# Patient Record
Sex: FEMALE | Race: WHITE | NOT HISPANIC OR LATINO | Employment: OTHER | URBAN - METROPOLITAN AREA
[De-identification: names, ages, dates, MRNs, and addresses within clinical notes are randomized per-mention and may not be internally consistent; named-entity substitution may affect disease eponyms.]

---

## 2017-01-21 DIAGNOSIS — E11.9 TYPE 2 DIABETES MELLITUS WITHOUT COMPLICATION: ICD-10-CM

## 2017-01-21 DIAGNOSIS — E78.5 HYPERLIPIDEMIA: ICD-10-CM

## 2017-01-21 DIAGNOSIS — I73.9 PERIPHERAL ARTERY DISEASE: ICD-10-CM

## 2017-01-21 DIAGNOSIS — F32.A DEPRESSION: ICD-10-CM

## 2017-01-21 DIAGNOSIS — I10 BENIGN ESSENTIAL HTN: ICD-10-CM

## 2017-01-23 DIAGNOSIS — I10 BENIGN ESSENTIAL HTN: ICD-10-CM

## 2017-01-23 RX ORDER — ATENOLOL 100 MG/1
TABLET ORAL
Qty: 90 TABLET | Refills: 1 | Status: SHIPPED | OUTPATIENT
Start: 2017-01-23 | End: 2017-02-15 | Stop reason: SDUPTHER

## 2017-01-23 RX ORDER — CILOSTAZOL 100 MG/1
TABLET ORAL
Qty: 180 TABLET | Refills: 1 | Status: SHIPPED | OUTPATIENT
Start: 2017-01-23 | End: 2017-02-15 | Stop reason: SDUPTHER

## 2017-01-23 RX ORDER — LOVASTATIN 20 MG/1
TABLET ORAL
Qty: 90 TABLET | Refills: 1 | Status: SHIPPED | OUTPATIENT
Start: 2017-01-23 | End: 2017-02-15 | Stop reason: SDUPTHER

## 2017-01-23 RX ORDER — ESCITALOPRAM OXALATE 10 MG/1
TABLET ORAL
Qty: 90 TABLET | Refills: 1 | Status: SHIPPED | OUTPATIENT
Start: 2017-01-23 | End: 2017-02-15 | Stop reason: SDUPTHER

## 2017-01-23 RX ORDER — METFORMIN HYDROCHLORIDE 500 MG/1
TABLET, EXTENDED RELEASE ORAL
Qty: 90 TABLET | Refills: 1 | Status: SHIPPED | OUTPATIENT
Start: 2017-01-23 | End: 2017-02-15 | Stop reason: SDUPTHER

## 2017-01-24 RX ORDER — AMLODIPINE AND OLMESARTAN MEDOXOMIL 5; 40 MG/1; MG/1
TABLET ORAL
Qty: 90 TABLET | Refills: 1 | Status: SHIPPED | OUTPATIENT
Start: 2017-01-24 | End: 2017-02-15 | Stop reason: SDUPTHER

## 2017-01-31 RX ORDER — NYSTATIN 100000 U/G
CREAM TOPICAL 2 TIMES DAILY
Qty: 30 G | Refills: 4 | Status: SHIPPED | OUTPATIENT
Start: 2017-01-31 | End: 2017-02-02 | Stop reason: SDUPTHER

## 2017-02-02 RX ORDER — NYSTATIN 100000 U/G
CREAM TOPICAL
Qty: 15 G | Refills: 3 | Status: SHIPPED | OUTPATIENT
Start: 2017-02-02 | End: 2017-02-15 | Stop reason: SDUPTHER

## 2017-02-15 ENCOUNTER — OFFICE VISIT (OUTPATIENT)
Dept: INTERNAL MEDICINE | Facility: CLINIC | Age: 82
End: 2017-02-15
Payer: MEDICARE

## 2017-02-15 VITALS
TEMPERATURE: 98 F | OXYGEN SATURATION: 96 % | SYSTOLIC BLOOD PRESSURE: 110 MMHG | HEIGHT: 63 IN | WEIGHT: 138.44 LBS | BODY MASS INDEX: 24.53 KG/M2 | HEART RATE: 82 BPM | RESPIRATION RATE: 16 BRPM | DIASTOLIC BLOOD PRESSURE: 60 MMHG

## 2017-02-15 DIAGNOSIS — I10 BENIGN ESSENTIAL HTN: ICD-10-CM

## 2017-02-15 DIAGNOSIS — I73.9 PERIPHERAL ARTERY DISEASE: ICD-10-CM

## 2017-02-15 DIAGNOSIS — Z78.0 ASYMPTOMATIC MENOPAUSAL STATE: ICD-10-CM

## 2017-02-15 DIAGNOSIS — Z13.820 OSTEOPOROSIS SCREENING: Primary | ICD-10-CM

## 2017-02-15 DIAGNOSIS — G47.00 INSOMNIA, UNSPECIFIED TYPE: ICD-10-CM

## 2017-02-15 DIAGNOSIS — E11.9 TYPE 2 DIABETES MELLITUS WITHOUT COMPLICATION, WITHOUT LONG-TERM CURRENT USE OF INSULIN: ICD-10-CM

## 2017-02-15 DIAGNOSIS — F32.A DEPRESSION, UNSPECIFIED DEPRESSION TYPE: ICD-10-CM

## 2017-02-15 DIAGNOSIS — E78.5 HYPERLIPIDEMIA, UNSPECIFIED HYPERLIPIDEMIA TYPE: ICD-10-CM

## 2017-02-15 PROCEDURE — 1159F MED LIST DOCD IN RCRD: CPT | Mod: S$GLB,,, | Performed by: INTERNAL MEDICINE

## 2017-02-15 PROCEDURE — 1160F RVW MEDS BY RX/DR IN RCRD: CPT | Mod: S$GLB,,, | Performed by: INTERNAL MEDICINE

## 2017-02-15 PROCEDURE — 1126F AMNT PAIN NOTED NONE PRSNT: CPT | Mod: S$GLB,,, | Performed by: INTERNAL MEDICINE

## 2017-02-15 PROCEDURE — 99499 UNLISTED E&M SERVICE: CPT | Mod: S$GLB,,, | Performed by: INTERNAL MEDICINE

## 2017-02-15 PROCEDURE — 1157F ADVNC CARE PLAN IN RCRD: CPT | Mod: S$GLB,,, | Performed by: INTERNAL MEDICINE

## 2017-02-15 PROCEDURE — 99214 OFFICE O/P EST MOD 30 MIN: CPT | Mod: S$GLB,,, | Performed by: INTERNAL MEDICINE

## 2017-02-15 RX ORDER — AMLODIPINE AND OLMESARTAN MEDOXOMIL 5; 40 MG/1; MG/1
1 TABLET ORAL DAILY
COMMUNITY
Start: 2017-01-24 | End: 2017-02-15 | Stop reason: SDUPTHER

## 2017-02-15 RX ORDER — CILOSTAZOL 100 MG/1
100 TABLET ORAL 2 TIMES DAILY
Qty: 180 TABLET | Refills: 1 | Status: SHIPPED | OUTPATIENT
Start: 2017-02-15 | End: 2017-10-24 | Stop reason: SDUPTHER

## 2017-02-15 RX ORDER — LOVASTATIN 20 MG/1
20 TABLET ORAL NIGHTLY
Qty: 90 TABLET | Refills: 1 | Status: SHIPPED | OUTPATIENT
Start: 2017-02-15 | End: 2017-08-23 | Stop reason: SDUPTHER

## 2017-02-15 RX ORDER — TEMAZEPAM 15 MG/1
15 CAPSULE ORAL NIGHTLY PRN
Qty: 30 CAPSULE | Refills: 3 | Status: SHIPPED | OUTPATIENT
Start: 2017-02-15 | End: 2017-05-17 | Stop reason: SDUPTHER

## 2017-02-15 RX ORDER — NYSTATIN 100000 U/G
CREAM TOPICAL
Qty: 15 G | Refills: 3 | Status: SHIPPED | OUTPATIENT
Start: 2017-02-15 | End: 2018-12-11 | Stop reason: SDUPTHER

## 2017-02-15 RX ORDER — ESCITALOPRAM OXALATE 10 MG/1
TABLET ORAL
Qty: 90 TABLET | Refills: 1 | Status: SHIPPED | OUTPATIENT
Start: 2017-02-15 | End: 2017-08-23 | Stop reason: SDUPTHER

## 2017-02-15 RX ORDER — AMLODIPINE AND OLMESARTAN MEDOXOMIL 5; 40 MG/1; MG/1
TABLET ORAL
Qty: 90 TABLET | Refills: 1 | Status: SHIPPED | OUTPATIENT
Start: 2017-02-15 | End: 2017-08-23 | Stop reason: SDUPTHER

## 2017-02-15 RX ORDER — ATENOLOL 100 MG/1
TABLET ORAL
Qty: 90 TABLET | Refills: 1 | Status: SHIPPED | OUTPATIENT
Start: 2017-02-15 | End: 2017-07-05 | Stop reason: SDUPTHER

## 2017-02-15 RX ORDER — METFORMIN HYDROCHLORIDE 500 MG/1
TABLET, EXTENDED RELEASE ORAL
Qty: 90 TABLET | Refills: 1 | Status: SHIPPED | OUTPATIENT
Start: 2017-02-15 | End: 2017-08-23 | Stop reason: SDUPTHER

## 2017-02-15 NOTE — MR AVS SNAPSHOT
[facility]  [address] ,  [address]  [place] [address]  Phone: [phone]  Fax: [phone]                  Viri Carpio   2/15/2017 9:20 AM   Office Visit    Description:  Female : 1927   Provider:  Alesha Casiano MD   Department:  [facility]           Reason for Visit     Follow-up     Medication Refill           Diagnoses this Visit        Comments    Osteoporosis screening    -  Primary     Insomnia, unspecified type         Benign essential HTN         Peripheral artery disease         Depression, unspecified depression type         Hyperlipidemia, unspecified hyperlipidemia type         Type 2 diabetes mellitus without complication, without long-term current use of insulin         Asymptomatic menopausal state                To Do List           Future Appointments        Provider Department Dept Phone    2017 9:20 AM Alesha Casiano MD [facility] [phone]      Goals (5 Years of Data)     None      Follow-Up and Disposition     Return in about 3 months (around 5/15/2017).       These Medications        Disp Refills Start End    temazepam (RESTORIL) 15 mg Cap 30 capsule 3 2/15/2017     Take 1 capsule (15 mg total) by mouth nightly as needed. - Oral    Ph[facility]: [id number] - [place] - [address] [facility] Ph #: [phone]       amlodipine-olmesartan (JIGNA) 5-40 mg per tablet 90 tablet 1 2/15/2017     TAKE 1 TABLET ONE TIME DAILY    Ph[facility]: [id number] - [place] - [address] [facility] Ph #: [phone]       atenolol (TENORMIN) 100 MG tablet 90 tablet 1 2/15/2017     TAKE 1 TABLET ONE TIME DAILY    Ph[facility]acy: [id number] - [place] - [address] [facility] Ph #: [phone]       cilostazol (PLETAL) 100 MG Tab 180 tablet 1 2/15/2017     Take 1 tablet (100 mg total) by mouth 2 (two) times daily. - Oral    Pharmacy: [id number] -  [place], [facility] Ph #: [phone]       escitalopram oxalate (LEXAPRO) 10 MG tablet 90 tablet 1 2/15/2017     TAKE 1 TABLET ONE TIME DAILY    Pharmacy: [id number]  [place], [address] Ph #: [phone]       lovastatin (MEVACOR) 20 MG tablet 90 tablet 1 2/15/2017     Take 1 tablet (20 mg total) by mouth every evening. - Oral    Pharmacy: [id number]  [place], [address] Ph #: [phone]       metformin (GLUCOPHAGE-XR) 500 MG 24 hr tablet 90 tablet 1 2/15/2017     TAKE 1 TABLET ONE TIME DAILY    [id number] [address] Ph #: [phone]       nystatin (MYCOSTATIN) cream 15 g 3 2/15/2017     APPLY TO AFFECTED AREAS THREE TIMES A DAY WITH OTHER CREAM    Pharmacy: [id number] - [place], [address] Ph #: [phone]         [facility]     [facility] Nurse Care Line - 24/7 Assistance  Registered nurses in the [facility] provide clinical advisement, health education, appointment booking, and other advisory services.  Call for this free service at [phone].             Medications           Message regarding Medications     Verify the changes and/or additions to your medication regime listed below are the same as discussed with your clinician today.  If any of these changes or additions are incorrect, please notify your healthcare provider.        CHANGE how you are taking these medications     Start Taking Instead of    cilostazol (PLETAL) 100 MG Tab cilostazol (PLETAL) 100 MG Tab    Dosage:  Take 1 tablet (100 mg total) by mouth 2 (two) times daily. Dosage:  TAKE 1 TABLET TWICE DAILY    Reason for Change:  Reorder     lovastatin (MEVACOR) 20 MG tablet lovastatin (MEVACOR) 20 MG tablet    Dosage:  Take 1 tablet (20 mg total) by mouth every evening. Dosage:  TAKE 1 TABLET EVERY EVENING    Reason for Change:  Reorder           "  Verify that the below list of medications is an accurate representation of the medications you are currently taking.  If none reported, the list may be blank. If incorrect, please contact your healthcare provider. Carry this list with you in case of emergency.           Current Medications     amlodipine-olmesartan (JIGNA) 5-40 mg per tablet TAKE 1 TABLET ONE TIME DAILY    aspirin (ECOTRIN) 81 MG EC tablet Take 81 mg by mouth once daily.    atenolol (TENORMIN) 100 MG tablet TAKE 1 TABLET ONE TIME DAILY    cilostazol (PLETAL) 100 MG Tab Take 1 tablet (100 mg total) by mouth 2 (two) times daily.    escitalopram oxalate (LEXAPRO) 10 MG tablet TAKE 1 TABLET ONE TIME DAILY    lovastatin (MEVACOR) 20 MG tablet Take 1 tablet (20 mg total) by mouth every evening.    metformin (GLUCOPHAGE-XR) 500 MG 24 hr tablet TAKE 1 TABLET ONE TIME DAILY    nystatin (MYCOSTATIN) cream APPLY TO AFFECTED AREAS THREE TIMES A DAY WITH OTHER CREAM    temazepam (RESTORIL) 15 mg Cap Take 1 capsule (15 mg total) by mouth nightly as needed.    triamcinolone acetonide 0.1% (KENALOG) 0.1 % cream            Clinical Reference Information           Your Vitals Were     BP Pulse Temp Resp Height Weight    110/60 (BP Location: Right arm, Patient Position: Sitting, BP Method: Manual) 82 98.1 °F (36.7 °C) (Oral) 16 5' 3" (1.6 m) 62.8 kg (138 lb 7.2 oz)    SpO2 BMI             96% 24.53 kg/m2         Blood Pressure          Most Recent Value    BP  110/60      Allergies as of 2/15/2017     No Known Allergies      Immunizations Administered on Date of Encounter - 2/15/2017     Name Date Dose VIS Date Route    Pneumococcal Polysaccharide - 23 Valent  Incomplete 0.5 mL 4/24/2015 Intramuscular      Orders Placed During Today's Visit      Normal Orders This Visit    Pneumococcal Polysaccharide Vaccine (23 Valent) (SQ/IM)     Future Labs/Procedures Expected by Expires    CBC auto differential  2/15/2017 2/15/2018    Comprehensive metabolic panel  2/15/2017 " 2/15/2018    DXA Bone Density Spine And Hip  2/15/2017 2/15/2018    Hemoglobin A1c  2/15/2017 2/15/2018    Lipid panel  2/15/2017 2/15/2018    Microalbumin/creatinine urine ratio  2/15/2017 2/15/2018      [facility] Sign-Up     Activating your [facility] account is as easy as 1-2-3!     1) Visit [email or web address], select Sign Up Now, enter this activation code and your date of birth, then select Next.  [email or web address]  Expires: 4/1/2017 10:17 AM      2) Create a username and password to use when you visit [facility] in the future and select a security question in case you lose your password and select Next.    3) Enter your e-mail address and click Sign Up!    Additional Information  If you have questions, please e-mail [email or web address] or call [phone] to talk to our [facility] staff. Remember, [facility] is NOT to be used for urgent needs. For medical emergencies, dial 911.         Language Assistance Services     ATTENTION: Language assistance services are available, free of charge. Please call [phone].      ATENCIÓN: Si habla español, tiene a lakhani disposición servicios gratuitos de asistencia lingüística. Llame al [phone].     CHÚ Ý: N?u b?n nói Ti?ng Vi?t, có các d?ch v? h? tr? ngôn ng? mi?n phí dành cho b?n. G?i s? [phone].         [facility] complies with applicable Federal civil rights laws and does not discriminate on the basis of race, color, national origin, age, disability, or sex.

## 2017-02-15 NOTE — PROGRESS NOTES
"Subjective:      Patient ID: Viri Carpio is a 89 y.o. female.    Chief Complaint: Medication Refill; Diabetes; and Hypertension    HPI: 89y/oWF, has done very well in general.  No recent labs.  However, she has been compliant with her medications.    Review of Systems   Constitutional: Negative.    HENT: Negative.    Eyes: Negative.    Respiratory: Negative.    Cardiovascular: Negative.    Gastrointestinal: Negative.    Endocrine: Negative.    Musculoskeletal: Negative.    Allergic/Immunologic: Negative.    Neurological: Negative.    Psychiatric/Behavioral: Negative.        Objective:     Visit Vitals    /60 (BP Location: Right arm, Patient Position: Sitting, BP Method: Manual)    Pulse 82    Temp 98.1 °F (36.7 °C) (Oral)    Resp 16    Ht 5' 3" (1.6 m)    Wt 62.8 kg (138 lb 7.2 oz)    SpO2 96%    BMI 24.53 kg/m2       Physical Exam   Constitutional: She is oriented to person, place, and time. She appears well-developed and well-nourished. No distress.   HENT:   Head: Normocephalic and atraumatic.   Right Ear: External ear normal.   Left Ear: External ear normal.   Nose: Nose normal.   Mouth/Throat: Oropharynx is clear and moist.   Bilat hearing aides  dentures   Eyes: Conjunctivae and EOM are normal. Pupils are equal, round, and reactive to light.   Neck: Normal range of motion. Neck supple.   Cardiovascular: Normal rate, regular rhythm and normal heart sounds.    Pulmonary/Chest: Effort normal and breath sounds normal.   Abdominal: Soft. Bowel sounds are normal. There is no hepatosplenomegaly. There is no tenderness.   Musculoskeletal: Normal range of motion. She exhibits no edema.   Neurological: She is alert and oriented to person, place, and time. She has normal reflexes.   Skin: Skin is warm and dry. She is not diaphoretic.   Psychiatric: She has a normal mood and affect. Her behavior is normal. Judgment and thought content normal.   Nursing note and vitals reviewed.      Assessment:     1. " Osteoporosis screening    2. Insomnia, unspecified type    3. Benign essential HTN    4. Peripheral artery disease    5. Depression, unspecified depression type    6. Hyperlipidemia, unspecified hyperlipidemia type    7. Type 2 diabetes mellitus without complication, without long-term current use of insulin    8. Asymptomatic menopausal state       Plan:     Osteoporosis screening  -     DXA Bone Density Spine And Hip; Future; Expected date: 2/15/17    Insomnia, unspecified type  -     temazepam (RESTORIL) 15 mg Cap; Take 1 capsule (15 mg total) by mouth nightly as needed.  Dispense: 30 capsule; Refill: 3    Benign essential HTN  -     CBC auto differential; Future; Expected date: 2/15/17  -     Comprehensive metabolic panel; Future; Expected date: 2/15/17  -     amlodipine-olmesartan (JIGNA) 5-40 mg per tablet; TAKE 1 TABLET ONE TIME DAILY  Dispense: 90 tablet; Refill: 1  -     atenolol (TENORMIN) 100 MG tablet; TAKE 1 TABLET ONE TIME DAILY  Dispense: 90 tablet; Refill: 1    Peripheral artery disease  -     cilostazol (PLETAL) 100 MG Tab; Take 1 tablet (100 mg total) by mouth 2 (two) times daily.  Dispense: 180 tablet; Refill: 1    Depression, unspecified depression type  -     escitalopram oxalate (LEXAPRO) 10 MG tablet; TAKE 1 TABLET ONE TIME DAILY  Dispense: 90 tablet; Refill: 1    Hyperlipidemia, unspecified hyperlipidemia type  -     Lipid panel; Future; Expected date: 2/15/17  -     lovastatin (MEVACOR) 20 MG tablet; Take 1 tablet (20 mg total) by mouth every evening.  Dispense: 90 tablet; Refill: 1    Type 2 diabetes mellitus without complication, without long-term current use of insulin  -     Hemoglobin A1c; Future; Expected date: 2/15/17  -     Microalbumin/creatinine urine ratio; Future; Expected date: 2/15/17  -     metformin (GLUCOPHAGE-XR) 500 MG 24 hr tablet; TAKE 1 TABLET ONE TIME DAILY  Dispense: 90 tablet; Refill: 1    Asymptomatic menopausal state   -     DXA Bone Density Spine And Hip; Future;  Expected date: 2/15/17    Other orders  -     Cancel: Pneumococcal Polysaccharide Vaccine (23 Valent) (SQ/IM)  -     nystatin (MYCOSTATIN) cream; APPLY TO AFFECTED AREAS THREE TIMES A DAY WITH OTHER CREAM  Dispense: 15 g; Refill: 3

## 2017-05-17 ENCOUNTER — OFFICE VISIT (OUTPATIENT)
Dept: INTERNAL MEDICINE | Facility: CLINIC | Age: 82
End: 2017-05-17
Payer: MEDICARE

## 2017-05-17 VITALS
RESPIRATION RATE: 16 BRPM | SYSTOLIC BLOOD PRESSURE: 132 MMHG | BODY MASS INDEX: 24.1 KG/M2 | HEART RATE: 60 BPM | TEMPERATURE: 98 F | DIASTOLIC BLOOD PRESSURE: 65 MMHG | HEIGHT: 63 IN | WEIGHT: 136 LBS | OXYGEN SATURATION: 96 %

## 2017-05-17 DIAGNOSIS — E78.5 HYPERLIPIDEMIA, UNSPECIFIED HYPERLIPIDEMIA TYPE: ICD-10-CM

## 2017-05-17 DIAGNOSIS — E11.9 TYPE 2 DIABETES MELLITUS WITHOUT COMPLICATION, WITHOUT LONG-TERM CURRENT USE OF INSULIN: Primary | ICD-10-CM

## 2017-05-17 DIAGNOSIS — I10 BENIGN ESSENTIAL HTN: ICD-10-CM

## 2017-05-17 DIAGNOSIS — G47.00 INSOMNIA, UNSPECIFIED TYPE: ICD-10-CM

## 2017-05-17 PROCEDURE — 99499 UNLISTED E&M SERVICE: CPT | Mod: S$GLB,,, | Performed by: INTERNAL MEDICINE

## 2017-05-17 PROCEDURE — 1159F MED LIST DOCD IN RCRD: CPT | Mod: S$GLB,,, | Performed by: INTERNAL MEDICINE

## 2017-05-17 PROCEDURE — 1126F AMNT PAIN NOTED NONE PRSNT: CPT | Mod: S$GLB,,, | Performed by: INTERNAL MEDICINE

## 2017-05-17 PROCEDURE — 1160F RVW MEDS BY RX/DR IN RCRD: CPT | Mod: S$GLB,,, | Performed by: INTERNAL MEDICINE

## 2017-05-17 PROCEDURE — 99214 OFFICE O/P EST MOD 30 MIN: CPT | Mod: S$GLB,,, | Performed by: INTERNAL MEDICINE

## 2017-05-17 RX ORDER — TEMAZEPAM 15 MG/1
15 CAPSULE ORAL NIGHTLY PRN
Qty: 30 CAPSULE | Refills: 3 | Status: SHIPPED | OUTPATIENT
Start: 2017-05-17 | End: 2017-06-19 | Stop reason: SDUPTHER

## 2017-05-17 RX ORDER — NYSTATIN AND TRIAMCINOLONE ACETONIDE 100000; 1 [USP'U]/G; MG/G
CREAM TOPICAL
COMMUNITY
Start: 2017-05-16

## 2017-05-17 NOTE — MR AVS SNAPSHOT
[facility]  [address],  [address]  [place] [address]  Phone: [phone]  Fax: [phone]                  Viri Carpio   2017 9:20 AM   Office Visit    Description:  Female : 1927   Provider:  Alesha Casiano MD   Department:  [facility]           Reason for Visit     Results     Medication Refill           Diagnoses this Visit        Comments    Insomnia, unspecified type                To Do List           Goals (5 Years of Data)     None       These Medications        Disp Refills Start End    temazepam (RESTORIL) 15 mg Cap 30 capsule 3 2017     Take 1 capsule (15 mg total) by mouth nightly as needed. - Oral    Pharmacy: [facility] - [place] [id number] [facility] Ph #: [phone]         [facility] On Call     [facility] On Call Nurse Care Line -  Assistance  Unless otherwise directed by your provider, please contact [facility], our nurse care line that is available for  assistance.     Registered nurses in the [facility] provide: appointment scheduling, clinical advisement, health education, and other advisory services.  Call: [phone] (toll free)               Medications           Message regarding Medications     Verify the changes and/or additions to your medication regime listed below are the same as discussed with your clinician today.  If any of these changes or additions are incorrect, please notify your healthcare provider.             Verify that the below list of medications is an accurate representation of the medications you are currently taking.  If none reported, the list may be blank. If incorrect, please contact your healthcare provider. Carry this list with you in case of emergency.           Current Medications     amlodipine-olmesartan (JIGNA) 5-40 mg per tablet TAKE 1 TABLET ONE TIME DAILY    aspirin (ECOTRIN) 81 MG EC tablet Take 81 mg by  "mouth once daily.    atenolol (TENORMIN) 100 MG tablet TAKE 1 TABLET ONE TIME DAILY    cilostazol (PLETAL) 100 MG Tab Take 1 tablet (100 mg total) by mouth 2 (two) times daily.    escitalopram oxalate (LEXAPRO) 10 MG tablet TAKE 1 TABLET ONE TIME DAILY    lovastatin (MEVACOR) 20 MG tablet Take 1 tablet (20 mg total) by mouth every evening.    metformin (GLUCOPHAGE-XR) 500 MG 24 hr tablet TAKE 1 TABLET ONE TIME DAILY    nystatin (MYCOSTATIN) cream APPLY TO AFFECTED AREAS THREE TIMES A DAY WITH OTHER CREAM    nystatin-triamcinolone (MYCOLOG II) cream     temazepam (RESTORIL) 15 mg Cap Take 1 capsule (15 mg total) by mouth nightly as needed.    triamcinolone acetonide 0.1% (KENALOG) 0.1 % cream            Clinical Reference Information           Your Vitals Were     BP Pulse Temp Resp Height Weight    132/65 (BP Location: Left arm, Patient Position: Sitting, BP Method: Manual) 60 97.8 °F (36.6 °C) (Oral) 16 5' 3" (1.6 m) 61.7 kg (136 lb 0.4 oz)    SpO2 BMI             96% 24.1 kg/m2         Blood Pressure          Most Recent Value    BP  132/65      Allergies as of 5/17/2017     No Known Allergies      Immunizations Administered on Date of Encounter - 5/17/2017     None      [facility] Sign-Up     Activating your [facility] account is as easy as 1-2-3!     1) Visit [email or web address], select Sign Up Now, enter this activation code and your date of birth, then select Next.  [email or web address]  Expires: 7/1/2017 10:30 AM      2) Create a username and password to use when you visit [facility] in the future and select a security question in case you lose your password and select Next.    3) Enter your e-mail address and click Sign Up!    Additional Information  If you have questions, please e-mail [email or web address] or call [phone] to talk to our [facility] staff. Remember, [facility] is NOT to be used for urgent needs. For medical emergencies, dial 911.         Language Assistance Services     ATTENTION: Language " assistance services are available, free of charge. Please call [phone].      ATENCIÓN: Si habla ulises, tiene a lakhani disposición servicios gratuitos de asistencia lingüística. Llame al [phone].     CHÚ Ý: N?u b?n nói Ti?ng Vi?t, có các d?ch v? h? tr? ngôn ng? mi?n phí dành cho b?n. G?i s? [phone].         [facility] complies with applicable Federal civil rights laws and does not discriminate on the basis of race, color, national origin, age, disability, or sex.

## 2017-05-17 NOTE — PROGRESS NOTES
Subjective:      Patient ID: Viri Carpio is a 89 y.o. female.    Chief Complaint: Results and Medication Refill    HPI: 89y/oWF, very active, asymptomatic.  Does everything for herself and her family.  Longevity in her family.  No hypoglycemic symptoms.  No CP,SOB,ATKINSON,PND,orthopnea.  No dysuria,polaquiria,polidipsia.  No leg pain.  No headaches.      Here with daughter. Results:  05/09/17 1023 HDL 55 - Final result   05/09/17 1023 CHOL 177 - Final result   05/09/17 1023 TRIG 141 - Final result   05/09/17 1023 LDLCALC 93.8 - Final result   05/09/17 1023 CHOLHDL 31.1 - Final result   05/09/17 1023 NONHDLCHOL 122 - Final result   05/09/17 1023 TOTALCHOLEST 3.2 - Final result   05/09/17 1023 HGBA1C 5.7 - Final result   05/09/17 1033 COLORU Yellow - Final result   05/09/17 1033 APPEARANCEUA Clear - Final result   05/09/17 1033 SPECGRAV 1.015 - Final result   05/09/17 1033 PHUR 6.0 - Final result   05/09/17 1033 KETONESU Negative - Final result   05/09/17 1033 OCCULTUA Trace Abnormal Final result   05/09/17 1033 NITRITE Negative - Final result   05/09/17 1033 UROBILINOGEN Negative - Final result   05/09/17 1033 LEUKOCYTESUR Negative - Final result   05/09/17 1023 WBC 5.39 - Final result   05/09/17 1023 RBC 4.28 - Final result   05/09/17 1023 HGB 13.2 - Final result   05/09/17 1023 HCT 38.9 - Final result   05/09/17 1023 MCH 30.8 - Final result   05/09/17 1023 RDW 13.3 - Final result   05/09/17 1023  - Final result   05/09/17 1023 MPV 10.3 - Final result   05/09/17 1023  - Final result   05/09/17 1023 BUN 15 - Final result   05/09/17 1023 CREATININE 0.92 - Final result   05/09/17 1023 CALCIUM 9.6 - Final result   05/09/17 1023  - Final result   05/09/17 1023 K 4.4 - Final result   05/09/17 1023  - Final result   05/09/17 1023 PROT 7.6 - Final result   05/09/17 1023 ALBUMIN 4.3 - Final result   05/09/17 1023 BILITOT 0.6 - Final result   05/09/17 1023 AST 21 - Final result   05/09/17 1023  "ALKPHOS 161 High Final result   05/09/17 1023 CO2 23 - Final result   05/09/17 1023 ALT 14 - Final result   05/09/17 1023 ANIONGAP 9 - Final result   05/09/17 1023 EGFRNONAA 55.4 Abnormal Final result   05/09/17 1023 ESTGFRAFRICA >60.0 - Final result   05/09/17 1023 MCV 91 -        Review of Systems   All other systems reviewed and are negative.      Objective:   /65 (BP Location: Left arm, Patient Position: Sitting, BP Method: Manual)  Pulse 60  Temp 97.8 °F (36.6 °C) (Oral)   Resp 16  Ht 5' 3" (1.6 m)  Wt 61.7 kg (136 lb 0.4 oz)  SpO2 96%  BMI 24.1 kg/m2    Physical Exam   Constitutional: She is oriented to person, place, and time. She appears well-developed and well-nourished.   HENT:   Head: Normocephalic and atraumatic.   Right Ear: External ear normal.   Left Ear: External ear normal.   Nose: Nose normal.   Mouth/Throat: Oropharynx is clear and moist.   Eyes: Conjunctivae are normal. Pupils are equal, round, and reactive to light.   Neck: Trachea normal. Neck supple. Carotid bruit is not present. No thyroid mass present.   Cardiovascular: Normal rate, regular rhythm and normal heart sounds.    Pulmonary/Chest: Effort normal and breath sounds normal.   Abdominal: Soft. Bowel sounds are normal.   Musculoskeletal: Normal range of motion. She exhibits no edema.   Neurological: She is alert and oriented to person, place, and time.   Skin: Skin is warm and dry.   Psychiatric: She has a normal mood and affect. Her behavior is normal. Judgment and thought content normal.   Nursing note and vitals reviewed.      Assessment:     1. Type 2 diabetes mellitus without complication, without long-term current use of insulin    2. Insomnia, unspecified type    3. Benign essential HTN    4. Hyperlipidemia, unspecified hyperlipidemia type      Plan:     Type 2 diabetes mellitus without complication, without long-term current use of insulin  -     Hemoglobin A1c; Future; Expected date: 9/14/17  -     " Microalbumin/creatinine urine ratio; Future; Expected date: 9/14/17    Insomnia, unspecified type  -     temazepam (RESTORIL) 15 mg Cap; Take 1 capsule (15 mg total) by mouth nightly as needed.  Dispense: 30 capsule; Refill: 3    Benign essential HTN  -     Basic metabolic panel; Future; Expected date: 9/14/17    Hyperlipidemia, unspecified hyperlipidemia type

## 2017-06-19 DIAGNOSIS — G47.00 INSOMNIA, UNSPECIFIED TYPE: ICD-10-CM

## 2017-06-19 RX ORDER — TEMAZEPAM 15 MG/1
CAPSULE ORAL
Qty: 30 CAPSULE | Refills: 2 | Status: SHIPPED | OUTPATIENT
Start: 2017-06-19 | End: 2017-09-05 | Stop reason: SDUPTHER

## 2017-07-05 DIAGNOSIS — I10 BENIGN ESSENTIAL HTN: ICD-10-CM

## 2017-07-05 RX ORDER — ATENOLOL 100 MG/1
TABLET ORAL
Qty: 90 TABLET | Refills: 1 | Status: SHIPPED | OUTPATIENT
Start: 2017-07-05 | End: 2017-11-15 | Stop reason: SDUPTHER

## 2017-07-05 NOTE — TELEPHONE ENCOUNTER
Pt requesting medication refill of ATENOLOL 100 mg. Taking 1 tablet by mouth daily. Pt requesting a 90 day supply. Pt stated VY is out of her medication as of now. Please send to [facility] in [place]. Vf/ma

## 2017-08-23 DIAGNOSIS — E11.9 TYPE 2 DIABETES MELLITUS WITHOUT COMPLICATION, WITHOUT LONG-TERM CURRENT USE OF INSULIN: ICD-10-CM

## 2017-08-23 DIAGNOSIS — I10 BENIGN ESSENTIAL HTN: ICD-10-CM

## 2017-08-23 DIAGNOSIS — E78.5 HYPERLIPIDEMIA, UNSPECIFIED HYPERLIPIDEMIA TYPE: ICD-10-CM

## 2017-08-23 DIAGNOSIS — F32.A DEPRESSION, UNSPECIFIED DEPRESSION TYPE: ICD-10-CM

## 2017-08-23 RX ORDER — LOVASTATIN 20 MG/1
TABLET ORAL
Qty: 90 TABLET | Refills: 1 | Status: SHIPPED | OUTPATIENT
Start: 2017-08-23 | End: 2017-08-23 | Stop reason: SDUPTHER

## 2017-08-23 RX ORDER — ESCITALOPRAM OXALATE 10 MG/1
TABLET ORAL
Qty: 90 TABLET | Refills: 1 | Status: SHIPPED | OUTPATIENT
Start: 2017-08-23 | End: 2018-02-19 | Stop reason: SDUPTHER

## 2017-08-23 RX ORDER — METFORMIN HYDROCHLORIDE 500 MG/1
TABLET, EXTENDED RELEASE ORAL
Qty: 90 TABLET | Refills: 1 | Status: SHIPPED | OUTPATIENT
Start: 2017-08-23 | End: 2017-08-23 | Stop reason: SDUPTHER

## 2017-08-23 RX ORDER — AMLODIPINE AND OLMESARTAN MEDOXOMIL 5; 40 MG/1; MG/1
TABLET ORAL
Qty: 90 TABLET | Refills: 1 | Status: SHIPPED | OUTPATIENT
Start: 2017-08-23 | End: 2018-02-19 | Stop reason: SDUPTHER

## 2017-08-23 NOTE — TELEPHONE ENCOUNTER
----- Message from Magali Conrad sent at 8/23/2017  2:08 PM CDT -----  Contact: Favio / daughter [phone]  Patient needs new prescription for Metformin and lovastatin [facility], patient is almost out and it will take several days to come in.   Daughter states that every time patient  Comes in every 3 months they request all prescription to be filled but it is not happening. I told her it may be something on [facility] end as well.

## 2017-08-24 RX ORDER — LOVASTATIN 20 MG/1
20 TABLET ORAL NIGHTLY
Qty: 90 TABLET | Refills: 1 | Status: SHIPPED | OUTPATIENT
Start: 2017-08-24 | End: 2018-02-19 | Stop reason: SDUPTHER

## 2017-08-24 RX ORDER — METFORMIN HYDROCHLORIDE 500 MG/1
500 TABLET, EXTENDED RELEASE ORAL DAILY
Qty: 90 TABLET | Refills: 1 | Status: SHIPPED | OUTPATIENT
Start: 2017-08-24 | End: 2018-02-19 | Stop reason: SDUPTHER

## 2017-09-05 ENCOUNTER — OFFICE VISIT (OUTPATIENT)
Dept: INTERNAL MEDICINE | Facility: CLINIC | Age: 82
End: 2017-09-05
Payer: MEDICARE

## 2017-09-05 VITALS
BODY MASS INDEX: 23.96 KG/M2 | RESPIRATION RATE: 16 BRPM | HEART RATE: 72 BPM | HEIGHT: 63 IN | DIASTOLIC BLOOD PRESSURE: 62 MMHG | TEMPERATURE: 98 F | WEIGHT: 135.25 LBS | SYSTOLIC BLOOD PRESSURE: 130 MMHG | OXYGEN SATURATION: 96 %

## 2017-09-05 DIAGNOSIS — I10 BENIGN ESSENTIAL HTN: ICD-10-CM

## 2017-09-05 DIAGNOSIS — E11.9 TYPE 2 DIABETES MELLITUS WITHOUT COMPLICATION, WITHOUT LONG-TERM CURRENT USE OF INSULIN: ICD-10-CM

## 2017-09-05 DIAGNOSIS — M77.9 TENDINITIS: Primary | ICD-10-CM

## 2017-09-05 DIAGNOSIS — E78.5 HYPERLIPIDEMIA, UNSPECIFIED HYPERLIPIDEMIA TYPE: ICD-10-CM

## 2017-09-05 DIAGNOSIS — G47.00 INSOMNIA, UNSPECIFIED TYPE: ICD-10-CM

## 2017-09-05 PROCEDURE — 3008F BODY MASS INDEX DOCD: CPT | Mod: S$GLB,,, | Performed by: INTERNAL MEDICINE

## 2017-09-05 PROCEDURE — 99214 OFFICE O/P EST MOD 30 MIN: CPT | Mod: S$GLB,,, | Performed by: INTERNAL MEDICINE

## 2017-09-05 PROCEDURE — 1159F MED LIST DOCD IN RCRD: CPT | Mod: S$GLB,,, | Performed by: INTERNAL MEDICINE

## 2017-09-05 PROCEDURE — 1125F AMNT PAIN NOTED PAIN PRSNT: CPT | Mod: S$GLB,,, | Performed by: INTERNAL MEDICINE

## 2017-09-05 PROCEDURE — 99499 UNLISTED E&M SERVICE: CPT | Mod: S$GLB,,, | Performed by: INTERNAL MEDICINE

## 2017-09-05 RX ORDER — PREDNISONE 5 MG/1
5 TABLET ORAL DAILY
Qty: 10 TABLET | Refills: 0 | Status: SHIPPED | OUTPATIENT
Start: 2017-09-05 | End: 2017-09-15

## 2017-09-05 RX ORDER — TEMAZEPAM 15 MG/1
15 CAPSULE ORAL NIGHTLY PRN
Qty: 30 CAPSULE | Refills: 5 | Status: SHIPPED | OUTPATIENT
Start: 2017-09-05 | End: 2017-12-05 | Stop reason: SDUPTHER

## 2017-09-05 RX ORDER — PREDNISONE 5 MG/1
5 TABLET ORAL DAILY
Qty: 10 TABLET | Refills: 0 | Status: SHIPPED | OUTPATIENT
Start: 2017-09-05 | End: 2017-09-05 | Stop reason: SDUPTHER

## 2017-09-05 NOTE — PROGRESS NOTES
"Subjective:      Patient ID: Viri Carpio is a 89 y.o. female.    Chief Complaint: Wrist Pain (right wrist pain for two weeks); Medication Refill (send all Rx to [facility]); Diabetes; and Hypertension    HPI: 89 y.o. White female , has had pain in her right wrist for several weeks now. She had it injected by [facility] years ago,  But does not want that done again. No known trauma,stress on joint.      Recent Labs  Lab Result Units 08/30/17  0906   Glucose mg/dL 108   BUN, Bld mg/dL 21*   Creatinine mg/dL 1.04   Calcium mg/dL 9.8   Sodium mmol/L 143       Recent Labs  Lab Result Units 08/30/17  0906   Potassium mmol/L 4.5   Chloride mmol/L 108       Recent Labs  Lab Result Units 08/30/17  0906   CO2 mmol/L 26   Anion Gap mmol/L 9   eGFR if non African American mL/min/1.73 m^2 47.4*     HGBA1C 5.5  Review of Systems   Constitutional: Negative.    HENT: Positive for hearing loss. Negative for congestion, ear pain, sore throat and tinnitus.    Respiratory: Negative for cough, chest tightness, shortness of breath and wheezing.    Cardiovascular: Negative for chest pain and palpitations.   Gastrointestinal: Negative.    Endocrine: Negative for polydipsia, polyphagia and polyuria.   Genitourinary: Negative.  Negative for dysuria, frequency and urgency.   Musculoskeletal: Positive for arthralgias.   Neurological: Negative for weakness, light-headedness and headaches.   Psychiatric/Behavioral: Negative.        Objective:   /62 (BP Location: Right arm, Patient Position: Sitting, BP Method: Medium (Manual))   Pulse 72   Temp 97.8 °F (36.6 °C) (Oral)   Resp 16   Ht 5' 3" (1.6 m)   Wt 61.4 kg (135 lb 4 oz)   SpO2 96%   BMI 23.96 kg/m²     Physical Exam   Constitutional: She is oriented to person, place, and time. She appears well-developed and well-nourished.   HENT:   Head: Normocephalic and atraumatic.   Right Ear: External ear normal.   Left Ear: External ear normal.   Nose: Nose normal.   Mouth/Throat: Oropharynx is " clear and moist.   Eyes: Conjunctivae and EOM are normal. Pupils are equal, round, and reactive to light.   Neck: Normal range of motion. Neck supple.   Cardiovascular: Normal rate, regular rhythm and normal heart sounds.    Pulses:       Dorsalis pedis pulses are 1+ on the right side, and 1+ on the left side.        Posterior tibial pulses are 1+ on the right side, and 1+ on the left side.   Pulmonary/Chest: Effort normal and breath sounds normal.   Abdominal: Soft. Bowel sounds are normal.   Musculoskeletal: Normal range of motion.        Right wrist: She exhibits tenderness and swelling. She exhibits normal range of motion, no bony tenderness, no effusion and no crepitus.        Right foot: There is normal range of motion and no deformity.        Left foot: There is normal range of motion and no deformity.   Feet:   Right Foot:   Protective Sensation: 10 sites tested. 10 sites sensed.   Skin Integrity: Negative for skin breakdown or dry skin.   Left Foot:   Protective Sensation: 10 sites tested. 10 sites sensed.   Skin Integrity: Negative for skin breakdown or dry skin.   Neurological: She is alert and oriented to person, place, and time.   Skin: Skin is warm and dry.   Psychiatric: She has a normal mood and affect.   Nursing note and vitals reviewed.      Assessment:     1. Tendinitis    2. Insomnia, unspecified type    3. Benign essential HTN    4. Type 2 diabetes mellitus without complication, without long-term current use of insulin    5. Hyperlipidemia, unspecified hyperlipidemia type      Plan:     Tendinitis  -     Discontinue: predniSONE (DELTASONE) 5 MG tablet; Take 1 tablet (5 mg total) by mouth once daily.  Dispense: 10 tablet; Refill: 0  -     predniSONE (DELTASONE) 5 MG tablet; Take 1 tablet (5 mg total) by mouth once daily.  Dispense: 10 tablet; Refill: 0    Insomnia, unspecified type  -     temazepam (RESTORIL) 15 mg Cap; Take 1 capsule (15 mg total) by mouth nightly as needed.  Dispense: 30  capsule; Refill: 5    Benign essential HTN  -     Basic metabolic panel; Future; Expected date: 12/06/2017  -     CBC auto differential; Future; Expected date: 12/06/2017    Type 2 diabetes mellitus without complication, without long-term current use of insulin  -     Urinalysis; Future; Expected date: 12/04/2017    Hyperlipidemia, unspecified hyperlipidemia type  -     Lipid panel; Future; Expected date: 12/04/2017

## 2017-10-16 ENCOUNTER — TELEPHONE (OUTPATIENT)
Dept: INTERNAL MEDICINE | Facility: CLINIC | Age: 82
End: 2017-10-16

## 2017-10-24 DIAGNOSIS — I73.9 PERIPHERAL ARTERY DISEASE: ICD-10-CM

## 2017-10-24 RX ORDER — CILOSTAZOL 100 MG/1
TABLET ORAL
Qty: 180 TABLET | Refills: 1 | Status: SHIPPED | OUTPATIENT
Start: 2017-10-24 | End: 2018-02-19 | Stop reason: SDUPTHER

## 2017-11-15 RX ORDER — ATENOLOL 50 MG/1
TABLET ORAL
Qty: 60 TABLET | Refills: 0 | Status: SHIPPED | OUTPATIENT
Start: 2017-11-15 | End: 2017-12-20 | Stop reason: SDUPTHER

## 2017-12-05 ENCOUNTER — OFFICE VISIT (OUTPATIENT)
Dept: INTERNAL MEDICINE | Facility: CLINIC | Age: 82
End: 2017-12-05
Payer: MEDICARE

## 2017-12-05 VITALS
OXYGEN SATURATION: 94 % | HEART RATE: 48 BPM | WEIGHT: 133.38 LBS | SYSTOLIC BLOOD PRESSURE: 118 MMHG | HEIGHT: 63 IN | RESPIRATION RATE: 16 BRPM | DIASTOLIC BLOOD PRESSURE: 50 MMHG | BODY MASS INDEX: 23.63 KG/M2

## 2017-12-05 DIAGNOSIS — G47.00 INSOMNIA, UNSPECIFIED TYPE: ICD-10-CM

## 2017-12-05 DIAGNOSIS — I10 BENIGN ESSENTIAL HTN: Primary | ICD-10-CM

## 2017-12-05 DIAGNOSIS — F32.A DEPRESSION, UNSPECIFIED DEPRESSION TYPE: ICD-10-CM

## 2017-12-05 DIAGNOSIS — E11.9 TYPE 2 DIABETES MELLITUS WITHOUT COMPLICATION, WITHOUT LONG-TERM CURRENT USE OF INSULIN: ICD-10-CM

## 2017-12-05 DIAGNOSIS — E78.5 HYPERLIPIDEMIA, UNSPECIFIED HYPERLIPIDEMIA TYPE: ICD-10-CM

## 2017-12-05 PROCEDURE — 99214 OFFICE O/P EST MOD 30 MIN: CPT | Mod: S$GLB,,, | Performed by: INTERNAL MEDICINE

## 2017-12-05 PROCEDURE — 99499 UNLISTED E&M SERVICE: CPT | Mod: S$GLB,,, | Performed by: INTERNAL MEDICINE

## 2017-12-05 PROCEDURE — 99999 PR PBB SHADOW E&M-EST. PATIENT-LVL IV: CPT | Mod: PBBFAC,,, | Performed by: INTERNAL MEDICINE

## 2017-12-05 RX ORDER — METOPROLOL TARTRATE 100 MG/1
TABLET ORAL
COMMUNITY
Start: 2017-10-18 | End: 2017-12-05

## 2017-12-05 RX ORDER — TEMAZEPAM 15 MG/1
15 CAPSULE ORAL NIGHTLY PRN
Qty: 30 CAPSULE | Refills: 5 | Status: SHIPPED | OUTPATIENT
Start: 2017-12-05 | End: 2018-03-06 | Stop reason: SDUPTHER

## 2017-12-05 NOTE — PROGRESS NOTES
Subjective:      Patient ID: Viri Carpio is a 89 y.o. female.    Chief Complaint: Diabetes    HPI: 89 y.o. White female, here for her check up.  Doing well by pt's record and her daughter's history.  Reviewed labs with them:      11/30/17 0914 HDL 57 - Final result   11/30/17 0914 CHOL 164 - Final result   11/30/17 0914 TRIG 95 - Final result   11/30/17 0914 LDLCALC 88.0 - Final result   11/30/17 0914 CHOLHDL 34.8 - Final result   11/30/17 0914 NONHDLCHOL 107 - Final result   11/30/17 0914 TOTALCHOLEST 2.9 - Final result   08/30/17 0906 HGBA1C 5.5 - Final result   11/30/17 0914 COLORU Yellow - Final result   11/30/17 0914 APPEARANCEUA Clear - Final result   11/30/17 0914 SPECGRAV 1.015 - Final result   11/30/17 0914 PHUR 6.0 - Final result   11/30/17 0914 KETONESU Negative - Final result   11/30/17 0914 OCCULTUA Negative - Final result   11/30/17 0914 NITRITE Negative - Final result   11/30/17 0914 UROBILINOGEN Negative - Final result   11/30/17 0914 LEUKOCYTESUR Negative - Final result   11/30/17 0914 WBC 6.13 - Final result   11/30/17 0914 RBC 4.14 - Final result   11/30/17 0914 HGB 12.7 - Final result   11/30/17 0914 HCT 38.0 - Final result   11/30/17 0914 MCH 30.7 - Final result   11/30/17 0914 RDW 13.5 - Final result   11/30/17 0914  - Final result   11/30/17 0914 MPV 10.6 - Final result   11/30/17 0914  - Final result   11/30/17 0914 BUN 15 - Final result   11/30/17 0914 CREATININE 1.06 - Final result   11/30/17 0914 CALCIUM 9.7 - Final result   11/30/17 0914  - Final result   11/30/17 0914 K 4.8 - Final result   11/30/17 0914  - Final result   05/09/17 1023 PROT 7.6 - Final result   05/09/17 1023 ALBUMIN 4.3 - Final result   05/09/17 1023 BILITOT 0.6 - Final result   05/09/17 1023 AST 21 - Final result   05/09/17 1023 ALKPHOS 161 High Final result   11/30/17 0914 CO2 29 - Final result   05/09/17 1023 ALT 14 - Final result   11/30/17 0914 ANIONGAP 7 Low Final result   11/30/17  "0914 EGFRNONAA 46.3 Abnormal Final result   11/30/17 0914 ESTGFRAFRICA 53.4 Abnormal Final result   11/30/17 0914             Review of Systems   Constitutional: Negative.    HENT: Negative.    Eyes: Negative.    Respiratory: Negative.    Cardiovascular: Negative.    Gastrointestinal: Negative.    Endocrine: Negative.    Genitourinary: Negative.    Musculoskeletal: Negative.    Skin: Negative.    Allergic/Immunologic: Negative.    Neurological: Negative.    Hematological: Negative.    Psychiatric/Behavioral: Negative.        Objective:   BP (!) 118/50 (BP Location: Right arm, Patient Position: Sitting, BP Method: Large (Manual))   Pulse (!) 48   Resp 16   Ht 5' 3" (1.6 m)   Wt 60.5 kg (133 lb 6.1 oz)   SpO2 (!) 94%   BMI 23.63 kg/m²     Physical Exam   Constitutional: She is oriented to person, place, and time. She appears well-developed and well-nourished.   HENT:   Head: Normocephalic and atraumatic.   Right Ear: External ear normal.   Left Ear: External ear normal.   Nose: Nose normal.   Mouth/Throat: Oropharynx is clear and moist.   Eyes: Conjunctivae and EOM are normal. Pupils are equal, round, and reactive to light.   Neck: Normal range of motion. Neck supple.   Cardiovascular: Normal rate, regular rhythm and normal heart sounds.    Pulmonary/Chest: Effort normal and breath sounds normal.   Abdominal: Soft.   Musculoskeletal: Normal range of motion.   Neurological: She is alert and oriented to person, place, and time.   Skin: Skin is warm and dry.   Psychiatric: She has a normal mood and affect. Her behavior is normal.   Nursing note and vitals reviewed.      Assessment:     1. Benign essential HTN    2. Insomnia, unspecified type    3. Depression, unspecified depression type    4. Type 2 diabetes mellitus without complication, without long-term current use of insulin    5. Hyperlipidemia, unspecified hyperlipidemia type    Stable.  Plan:     Benign essential HTN    Insomnia, unspecified type  -     " temazepam (RESTORIL) 15 mg Cap; Take 1 capsule (15 mg total) by mouth nightly as needed.  Dispense: 30 capsule; Refill: 5    Depression, unspecified depression type    Type 2 diabetes mellitus without complication, without long-term current use of insulin    Hyperlipidemia, unspecified hyperlipidemia type    Same.

## 2017-12-05 NOTE — PATIENT INSTRUCTIONS
Handwashing: Tips for Patients, Family, and Friends    Germs are everywhere around us. Normally, we live with germs without getting sick. In certain cases, harmful germs cause us to get sick with an infection. Or we can spread harmful germs to others and cause them to get sick. Keeping your hands clean is the best way to prevent getting or spreading germs that cause infection. Wash your hands with soap and water or use an alcohol-based hand .  When to clean your hands: For patients  In the hospital or in your home, you can come in contact with many harmful germs. To help prevent infection, wash your hands often, especially:  · After using the bathroom  · Before and after eating  · After coughing or sneezing  · After using a tissue  · After touching or changing a dressing or bandage  · After touching any object or surface that may be contaminated  · After touching an animal during a pet therapy session (hospital)  · After touching an animal, cleaning up after a pet, or preparing food for pets (home)  If you dont have access to soap and water, use an alcohol-based hand gel containing at least 60% alcohol. These products kill most germs and are easy to use. But use soap and water (not alcohol-based hand gel) if your hands are visibly dirty.  When to clean your hands: For family and friends  When visiting or caring for a loved one, washing your hands or using an alcohol-based hand  can help stop germs from spreading. Wash your hands:  · Before entering and after leaving the patients room  · As soon as you remove gloves or other protective clothing  · After changing a dressing or bandage  · After any contact with blood or other body fluids  · After touching or changing the patients bed linen or towels  · After touching an animal during a pet therapy session (hospital)  · After touching an animal, cleaning up after a pet, or preparing food for pets (home)  Many hospitals have sinks or gel dispensers  right outside patient rooms. If not, carry a bottle of alcohol-based hand gel with you, and use it every time you visit. Use soap and water (not alcohol-based hand gel) if your hands are visibly dirty.  Tips for good handwashing  Here are some suggestions to follow:  · Use warm water and plenty of soap. Work up a good lather.  · Clean the whole hand, including under your nails, between your fingers, and up the wrists.  · Wash for at least 15 to 20 seconds. Dont just wipe. Scrub well.  · Rinse, letting the water run down your fingers, not up your wrists.  · Dry your hands well. Use a paper towel to turn off the faucet and open the door.  Time matters  The longer you wash your hands, the more germs youll remove. Most people wash their hands for 6 to 7 seconds. But at least 15 seconds are needed to remove germs. Singing Happy Birthday or the Alphabet Song are examples of how long 15 seconds would be. To protect yourself and others from infection, washing for 30 seconds is best.  How to use an alcohol-based hand   Alcohol-based hand  may kill more germs than soap and water. Use them when your hands arent visibly dirty. For best results, follow these steps:  · Choose a gel or spray that contains at least 60 percent alcohol. Products with less alcohol may not kill germs.  · Spread about a tablespoon of  in the palm of one hand.  · Rub your hands together briskly, cleaning the backs of your hands, the palms, between your fingers, and up the wrists.  · Rub until the  is gone, and your hands are completely dry.  How do antibacterial soaps and alcohol-based hand  differ?  Antibacterial soaps:  · Come in liquid or bar form and are used with water  · Are no better at removing germs than plain soap  Alcohol-based hand :  · Come in gels or sprays that dont need water  · Are as or more effective than washing with soap and water   Date Last Reviewed: 12/1/2016  © 4477-7874 [employer]. [address] [place] [address], [place] [address]. All rights reserved. This information is not intended as a substitute for professional medical care. Always follow your healthcare professional's instructions.

## 2017-12-20 RX ORDER — ATENOLOL 50 MG/1
TABLET ORAL
Qty: 60 TABLET | Refills: 0 | Status: SHIPPED | OUTPATIENT
Start: 2017-12-20 | End: 2018-03-06 | Stop reason: SDUPTHER

## 2018-01-23 RX ORDER — ATENOLOL 50 MG/1
TABLET ORAL
Qty: 60 TABLET | Refills: 3 | Status: SHIPPED | OUTPATIENT
Start: 2018-01-23 | End: 2018-03-06 | Stop reason: SDUPTHER

## 2018-02-08 ENCOUNTER — PES CALL (OUTPATIENT)
Dept: ADMINISTRATIVE | Facility: CLINIC | Age: 83
End: 2018-02-08

## 2018-02-19 DIAGNOSIS — E78.5 HYPERLIPIDEMIA, UNSPECIFIED HYPERLIPIDEMIA TYPE: ICD-10-CM

## 2018-02-19 DIAGNOSIS — E11.9 TYPE 2 DIABETES MELLITUS WITHOUT COMPLICATION, WITHOUT LONG-TERM CURRENT USE OF INSULIN: ICD-10-CM

## 2018-02-19 DIAGNOSIS — F32.A DEPRESSION, UNSPECIFIED DEPRESSION TYPE: ICD-10-CM

## 2018-02-19 DIAGNOSIS — I73.9 PERIPHERAL ARTERY DISEASE: ICD-10-CM

## 2018-02-19 DIAGNOSIS — I10 BENIGN ESSENTIAL HTN: ICD-10-CM

## 2018-02-19 RX ORDER — CILOSTAZOL 100 MG/1
TABLET ORAL
Qty: 180 TABLET | Refills: 1 | Status: SHIPPED | OUTPATIENT
Start: 2018-02-19 | End: 2018-03-06 | Stop reason: SDUPTHER

## 2018-02-20 RX ORDER — ESCITALOPRAM OXALATE 10 MG/1
TABLET ORAL
Qty: 90 TABLET | Refills: 1 | Status: SHIPPED | OUTPATIENT
Start: 2018-02-20 | End: 2018-03-06 | Stop reason: SDUPTHER

## 2018-02-20 RX ORDER — LOVASTATIN 20 MG/1
TABLET ORAL
Qty: 90 TABLET | Refills: 1 | Status: SHIPPED | OUTPATIENT
Start: 2018-02-20 | End: 2018-03-06 | Stop reason: SDUPTHER

## 2018-02-20 RX ORDER — AMLODIPINE AND OLMESARTAN MEDOXOMIL 5; 40 MG/1; MG/1
TABLET ORAL
Qty: 90 TABLET | Refills: 1 | Status: SHIPPED | OUTPATIENT
Start: 2018-02-20 | End: 2018-03-06 | Stop reason: SDUPTHER

## 2018-02-20 RX ORDER — METFORMIN HYDROCHLORIDE 500 MG/1
TABLET, EXTENDED RELEASE ORAL
Qty: 90 TABLET | Refills: 1 | Status: SHIPPED | OUTPATIENT
Start: 2018-02-20 | End: 2018-03-06 | Stop reason: SDUPTHER

## 2018-03-06 ENCOUNTER — OFFICE VISIT (OUTPATIENT)
Dept: INTERNAL MEDICINE | Facility: CLINIC | Age: 83
End: 2018-03-06
Payer: MEDICARE

## 2018-03-06 VITALS
HEART RATE: 48 BPM | SYSTOLIC BLOOD PRESSURE: 120 MMHG | WEIGHT: 131.38 LBS | BODY MASS INDEX: 23.28 KG/M2 | OXYGEN SATURATION: 94 % | DIASTOLIC BLOOD PRESSURE: 60 MMHG

## 2018-03-06 DIAGNOSIS — I73.9 PERIPHERAL ARTERY DISEASE: ICD-10-CM

## 2018-03-06 DIAGNOSIS — E11.9 TYPE 2 DIABETES MELLITUS WITHOUT COMPLICATION, WITHOUT LONG-TERM CURRENT USE OF INSULIN: Primary | ICD-10-CM

## 2018-03-06 DIAGNOSIS — F32.A DEPRESSION, UNSPECIFIED DEPRESSION TYPE: ICD-10-CM

## 2018-03-06 DIAGNOSIS — G47.00 INSOMNIA, UNSPECIFIED TYPE: ICD-10-CM

## 2018-03-06 DIAGNOSIS — E78.5 HYPERLIPIDEMIA, UNSPECIFIED HYPERLIPIDEMIA TYPE: ICD-10-CM

## 2018-03-06 DIAGNOSIS — I10 BENIGN ESSENTIAL HTN: ICD-10-CM

## 2018-03-06 PROCEDURE — 99999 PR PBB SHADOW E&M-EST. PATIENT-LVL III: CPT | Mod: PBBFAC,,, | Performed by: INTERNAL MEDICINE

## 2018-03-06 PROCEDURE — 99499 UNLISTED E&M SERVICE: CPT | Mod: S$GLB,,, | Performed by: INTERNAL MEDICINE

## 2018-03-06 PROCEDURE — 99214 OFFICE O/P EST MOD 30 MIN: CPT | Mod: S$GLB,,, | Performed by: INTERNAL MEDICINE

## 2018-03-06 RX ORDER — CILOSTAZOL 100 MG/1
100 TABLET ORAL 2 TIMES DAILY
Qty: 180 TABLET | Refills: 1 | Status: SHIPPED | OUTPATIENT
Start: 2018-03-06 | End: 2018-08-01 | Stop reason: SDUPTHER

## 2018-03-06 RX ORDER — ESCITALOPRAM OXALATE 10 MG/1
10 TABLET ORAL DAILY
Qty: 90 TABLET | Refills: 1 | Status: SHIPPED | OUTPATIENT
Start: 2018-03-06 | End: 2018-10-18 | Stop reason: SDUPTHER

## 2018-03-06 RX ORDER — TEMAZEPAM 15 MG/1
15 CAPSULE ORAL NIGHTLY PRN
Qty: 30 CAPSULE | Refills: 5 | Status: SHIPPED | OUTPATIENT
Start: 2018-03-06 | End: 2018-03-19 | Stop reason: SDUPTHER

## 2018-03-06 RX ORDER — AMLODIPINE AND OLMESARTAN MEDOXOMIL 5; 40 MG/1; MG/1
1 TABLET ORAL DAILY
Qty: 90 TABLET | Refills: 1 | Status: SHIPPED | OUTPATIENT
Start: 2018-03-06 | End: 2018-10-18 | Stop reason: SDUPTHER

## 2018-03-06 RX ORDER — LOVASTATIN 20 MG/1
20 TABLET ORAL NIGHTLY
Qty: 90 TABLET | Refills: 1 | Status: SHIPPED | OUTPATIENT
Start: 2018-03-06 | End: 2018-10-18 | Stop reason: SDUPTHER

## 2018-03-06 RX ORDER — METFORMIN HYDROCHLORIDE 500 MG/1
500 TABLET, EXTENDED RELEASE ORAL DAILY
Qty: 90 TABLET | Refills: 1 | Status: SHIPPED | OUTPATIENT
Start: 2018-03-06 | End: 2018-09-05 | Stop reason: ALTCHOICE

## 2018-03-06 RX ORDER — ATENOLOL 100 MG/1
100 TABLET ORAL DAILY
Qty: 90 TABLET | Refills: 1 | Status: SHIPPED | OUTPATIENT
Start: 2018-03-06 | End: 2018-10-18 | Stop reason: SDUPTHER

## 2018-03-06 RX ORDER — ATENOLOL 50 MG/1
100 TABLET ORAL DAILY
Qty: 180 TABLET | Refills: 1 | Status: SHIPPED | OUTPATIENT
Start: 2018-03-06 | End: 2018-03-06 | Stop reason: DRUGHIGH

## 2018-03-06 RX ORDER — ATENOLOL 100 MG/1
TABLET ORAL
COMMUNITY
Start: 2018-02-20 | End: 2018-03-06 | Stop reason: SDUPTHER

## 2018-03-06 NOTE — PATIENT INSTRUCTIONS
Diabetes: Activity Tips    Being more active can help you manage your diabetes. The tips on this sheet can help you get the most from your exercise. They can also help you stay safe.  Staying Active  Its important for adults to spend less time sitting and being inactive. This is especially true if you have type 2 diabetes. When you are sitting for long periods of time, get up for short sessions of light activity every 30 minutes.  You should aim for at least 150 minutes a week of exercise or physical activity. Dont let more than 2 days go by without being active.  Benefit from briskness  Brisk activity gets your heart beating faster. This can help you increase your fitness, lose extra weight, and manage your blood sugar level. Try brisk walking. Or, if you have foot or leg problems, you can try swimming or bike riding. You can break up your exercise into chunks throughout the day. Work up to at least 30 minutes of steady, brisk exercise on most days.  Warm up and cool down  Warming up and cooling down reduce your risk of injury. They also help limit muscle soreness. Do a mild version of your activity for 5 minutes before and after your routine. You can also learn stretches that will help keep your muscles loose. Your healthcare provider may show you good ways to warm up and stretch.  Do the talk-sing test  The talk-sing test is a simple way to tell how hard youre exercising. If you can talk while exercising, youre in a safe range. If youre out of breath, slow down. If you can carry a tune, its time to  the pace. Walk up a hill. Increase the resistance on your stationary bike. Or swim faster.  What about eating?  You may be told to plan your exercise for 1 to 2 hours after a meal. In most cases, you dont need to eat while being active. If you take insulin or medicine that can cause low blood sugar, test your blood sugar before exercising. And carry a fast-acting sugar that will raise your blood sugar  level quickly. This includes glucose tablets or hard candy. Use it if you feel low blood sugar symptoms.  Safety tips  These tips can help you stay safe as you become fit:  · Exercise with a friend or carry a cell phone if you have one.  · Carry or wear identification, such as a necklace or bracelet, that says you have diabetes.  · Use the proper footwear and safety equipment for your activity.  · Drink water before, during, and after exercise.  · Dress properly for the weather.  · Dont exercise in very hot or very cold weather.  · Dont exercise if you are sick.  · If you are instructed to do so, test your blood sugar before and after you exercise. Have a small carbohydrate snack if your blood sugar is low before you start exercising.   When to stop exercising and call your healthcare provider  Stop exercising and call your healthcare provider right away if you notice any of the following:  · Pain, pressure, tightness, or heaviness in the chest  · Pain or heaviness in the neck, shoulders, back, arms, legs, or feet  · Unusual shortness of breath  · Dizziness or lightheadedness  · Unusually rapid or slow pulse  · Increased joint or muscle pain  · Nausea or vomiting  Date Last Reviewed: 5/1/2016  © 2767-8001 [employer]. [address] [place] [address], [place] [id number]. All rights reserved. This information is not intended as a substitute for professional medical care. Always follow your healthcare professional's instructions.        Healthy Meals for Diabetes     A healthcare provider will help you develop a meal plan that fits your needs.   Ask your healthcare team to help you make a meal plan that fits your needs. Your meal plan tells you when to eat your meals and snacks, what kinds of foods to eat, and how much of each food to eat. You dont have to give up all the foods you like. But you do need to follow some guidelines.  Choose healthy carbohydrates  Starches, sugars, and fiber are all types of  carbohydrates. Fiber can help lower your cholesterol and triglycerides. Fiber is also healthy for your heart. You should have 20 to 35 grams of total fiber each day. Fiber-rich foods include:  · Whole-grain breads and cereals  · Bulgur wheat  · Brown rice     · Whole-wheat pasta  · Fruits and vegetables  · Dry beans, and peas   Keep track of the amount of carbohydrates you eat. This can help you keep the right balance of physical activity and medicine. The amount of carbohydrates needed will vary for each person. It depends on many things such as your health, the medicines you take, and how active you are. Your healthcare team will help you figure out the right amount of carbohydrates for you. You may start with around 45 to 60 grams of carbohydrates per meal, depending on your situation.   Here are some examples of foods containing about 15 grams of carbohydrates (1 serving of carbohydrates):  · 1/2 cup of canned or frozen fruit  · A small piece of fresh fruit (4 ounces)  · 1 slice of bread  · 1/2 cup of oatmeal  · 1/3 cup of rice  · 4 to 6 crackers  · 1/2 English muffin  · 1/2 cup of black beans  · 1/4 of a large baked potato (3 ounces)  · 2/3 cup of plain fat-free yogurt  · 1 cup of soup  · 1/2 cup of casserole  · 6 chicken nuggets  · 2-inch-square brownie or cake without frosting  · 2 small cookies  · 1/2 cup of ice cream or sherbet  Choose healthy protein foods  Eating protein that is low in fat can help you control your weight. It also helps keep your heart healthy. Low-fat protein foods include:  · Fish  · Plant proteins, such as dry beans and peas, nuts, and soy products like tofu and soymilk  · Lean meat with all visible fat removed  · Poultry with the skin removed  · Low-fat or nonfat milk, cheese, and yogurt  Limit unhealthy fats and sugar  Saturated and trans fats are unhealthy for your heart. They raise LDL (bad) cholesterol. Fat is also high in calories, so it can make you gain weight. To cut down on  unhealthy fats and sugar, limit these foods:  · Butter or margarine  · Palm and palm kernel oils and coconut oil  · Cream  · Cheese  · Castillo  · Lunch meats     · Ice cream  · Sweet bakery goods such as pies, muffins, and donuts  · Jams and jellies  · Candy bars  · Regular sodas   How much to eat  The amount of food you eat affects your blood sugar. It also affects your weight. Your healthcare team will tell you how much of each type of food you should eat.  · Use measuring cups and spoons and a food scale to measure serving sizes.  · Learn what a correct serving size looks like on your plate. This will help when you are away from home and cant measure your servings.  · Eat only the number of servings given on your meal plan for each food. Dont take seconds.  · Learn to read food labels. Be sure to look at serving size, total carbohydrates, fiber, calories, sugar, and saturated and trans fats. Look for healthier alternatives to foods that have added sugar.  · Plan ahead for parties so you can still have a good time without going overboard with unhealthy food choices. Set a good example yourself by bringing a healthy dish to pot lucks.   Choose healthy snacks  When it comes to snacks, we usually think about foods with added sugar and fats. But there are many other options for healthier snack choices. Here are a few snack ideas to choose from:  Snacks with less than 5 grams of carbohydrates  · 1 piece of string cheese  · 3 celery sticks plus 1 tablespoon of peanut butter  · 5 cherry tomatoes plus 1 tablespoon of ranch dressing  · 1 hard-boiled egg  · 1/4 cup of fresh blueberries  ·  5 baby carrots  · 1 cup of light popcorn  · 1/2 cup of sugar-free gelatin  · 15 almonds  Snacks with about 10 to 20 grams of carbohydrates  · 1/3 cup of hummus plus 1 cup of fresh cut nonstarchy vegetables (carrots, green peppers, broccoli, celery, or a combination)  · 1/2 cup of fresh or canned fruit plus 1/4 cup of cottage cheese  · 1/2  cup of tuna salad with 4 crackers  · 2 rice cakes and a tablespoon of peanut butter  · 1 small apple or orange  · 3 cups light popcorn  · 1/2 of a turkey sandwich (1 slice of whole-wheat bread, 2 ounces of turkey, and mustard)  Portion sizes are important to controlling your blood sugar and staying at a healthy weight. Stock up on healthy snack items so you always have them on hand.  When to eat  Your meal plan will likely include breakfast, lunch, dinner, and some snacks.  · Try to eat your meals and snacks at about the same times each day.  · Eat all your meals and snacks. Skipping a meal or snack can make your blood sugar drop too low. It can also cause you to eat too much at the next meal or snack. Then your blood sugar could get too high.  Date Last Reviewed: 7/1/2016  © 4922-9873 [employer]. [address] [place] [address], [place] [address]. All rights reserved. This information is not intended as a substitute for professional medical care. Always follow your healthcare professional's instructions.

## 2018-03-06 NOTE — PROGRESS NOTES
Subjective:      Patient ID: Viri Carpio is a 89 y.o. female.    Chief Complaint: Annual Exam (Refills send to [facility])    HPI: 89 y.o. White female , here with her daughter.  She is doing well. Did not get sick this winter.  No complaints.       Review of Systems   Constitutional: Negative.    HENT: Negative.    Eyes: Negative.    Respiratory: Negative for chest tightness, shortness of breath and wheezing.    Cardiovascular: Negative for chest pain.   Gastrointestinal: Negative.    Endocrine: Negative for polydipsia, polyphagia and polyuria.   Genitourinary: Negative for hematuria and urgency.   Musculoskeletal: Negative.    Neurological: Negative for weakness.   Psychiatric/Behavioral: Negative for dysphoric mood.       Objective:   /60 (BP Location: Right arm, Patient Position: Sitting, BP Method: Large (Manual))   Pulse (!) 48   Wt 59.6 kg (131 lb 6.3 oz)   SpO2 (!) 94%   BMI 23.28 kg/m²     Physical Exam   Constitutional: She is oriented to person, place, and time. She appears well-developed and well-nourished.   HENT:   Head: Normocephalic and atraumatic.   Right Ear: External ear normal.   Left Ear: External ear normal.   Nose: Nose normal.   Mouth/Throat: Oropharynx is clear and moist.   Eyes: Conjunctivae and EOM are normal. Pupils are equal, round, and reactive to light.   Neck: Normal range of motion. Neck supple.   Cardiovascular: Normal rate, regular rhythm and normal heart sounds.    Pulmonary/Chest: Effort normal and breath sounds normal.   Abdominal: Soft. Bowel sounds are normal.   Musculoskeletal: Normal range of motion.   Neurological: She is alert and oriented to person, place, and time.   Skin: Skin is warm and dry.   Psychiatric: She has a normal mood and affect. Her behavior is normal.   Nursing note and vitals reviewed.      Assessment:     1. Type 2 diabetes mellitus without complication, without long-term current use of insulin    2. Hyperlipidemia, unspecified  hyperlipidemia type    3. Peripheral artery disease    4. Benign essential HTN    5. Depression, unspecified depression type    6. Insomnia, unspecified type      Plan:     Type 2 diabetes mellitus without complication, without long-term current use of insulin  -     metFORMIN (GLUCOPHAGE-XR) 500 MG 24 hr tablet; Take 1 tablet (500 mg total) by mouth once daily.  Dispense: 90 tablet; Refill: 1  -     Hemoglobin A1c; Future; Expected date: 06/04/2018  -     Microalbumin/creatinine urine ratio; Future; Expected date: 06/04/2018    Hyperlipidemia, unspecified hyperlipidemia type  -     lovastatin (MEVACOR) 20 MG tablet; Take 1 tablet (20 mg total) by mouth every evening.  Dispense: 90 tablet; Refill: 1  -     Lipid panel; Future; Expected date: 06/04/2018    Peripheral artery disease  -     cilostazol (PLETAL) 100 MG Tab; Take 1 tablet (100 mg total) by mouth 2 (two) times daily.  Dispense: 180 tablet; Refill: 1    Benign essential HTN  -     amlodipine-olmesartan (JIGNA) 5-40 mg per tablet; Take 1 tablet by mouth once daily.  Dispense: 90 tablet; Refill: 1  -     atenolol (TENORMIN) 100 MG tablet; Take 1 tablet (100 mg total) by mouth once daily.  Dispense: 90 tablet; Refill: 1  -     CBC auto differential; Future; Expected date: 06/06/2018  -     Comprehensive metabolic panel; Future; Expected date: 06/06/2018  -     TSH; Future; Expected date: 06/06/2018    Depression, unspecified depression type  -     escitalopram oxalate (LEXAPRO) 10 MG tablet; Take 1 tablet (10 mg total) by mouth once daily.  Dispense: 90 tablet; Refill: 1    Insomnia, unspecified type  -     temazepam (RESTORIL) 15 mg Cap; Take 1 capsule (15 mg total) by mouth nightly as needed.  Dispense: 30 capsule; Refill: 5    Other orders  -     Discontinue: atenolol (TENORMIN) 50 MG tablet; Take 2 tablets (100 mg total) by mouth once daily.  Dispense: 180 tablet; Refill: 1

## 2018-03-19 DIAGNOSIS — G47.00 INSOMNIA, UNSPECIFIED TYPE: ICD-10-CM

## 2018-03-19 RX ORDER — TEMAZEPAM 15 MG/1
15 CAPSULE ORAL NIGHTLY PRN
Qty: 30 CAPSULE | Refills: 5 | Status: SHIPPED | OUTPATIENT
Start: 2018-03-19 | End: 2018-03-26 | Stop reason: SDUPTHER

## 2018-03-26 DIAGNOSIS — G47.00 INSOMNIA, UNSPECIFIED TYPE: ICD-10-CM

## 2018-03-26 NOTE — TELEPHONE ENCOUNTER
Patient requesting medication refill Temazepam( Restoril )15 mg cap. [facility]. Vf/ma

## 2018-03-27 RX ORDER — TEMAZEPAM 15 MG/1
15 CAPSULE ORAL NIGHTLY PRN
Qty: 30 CAPSULE | Refills: 5 | Status: SHIPPED | OUTPATIENT
Start: 2018-03-27 | End: 2018-06-06 | Stop reason: SDUPTHER

## 2018-04-27 ENCOUNTER — PES CALL (OUTPATIENT)
Dept: ADMINISTRATIVE | Facility: CLINIC | Age: 83
End: 2018-04-27

## 2018-06-06 ENCOUNTER — OFFICE VISIT (OUTPATIENT)
Dept: INTERNAL MEDICINE | Facility: CLINIC | Age: 83
End: 2018-06-06
Payer: MEDICARE

## 2018-06-06 VITALS
WEIGHT: 127.31 LBS | HEIGHT: 63 IN | HEART RATE: 60 BPM | BODY MASS INDEX: 22.56 KG/M2 | OXYGEN SATURATION: 96 % | DIASTOLIC BLOOD PRESSURE: 60 MMHG | SYSTOLIC BLOOD PRESSURE: 110 MMHG | RESPIRATION RATE: 16 BRPM

## 2018-06-06 DIAGNOSIS — G47.00 INSOMNIA, UNSPECIFIED TYPE: ICD-10-CM

## 2018-06-06 DIAGNOSIS — N18.30 CKD (CHRONIC KIDNEY DISEASE) STAGE 3, GFR 30-59 ML/MIN: ICD-10-CM

## 2018-06-06 DIAGNOSIS — E03.4 HYPOTHYROIDISM DUE TO ACQUIRED ATROPHY OF THYROID: Primary | ICD-10-CM

## 2018-06-06 DIAGNOSIS — M25.511 ACUTE PAIN OF RIGHT SHOULDER: ICD-10-CM

## 2018-06-06 PROCEDURE — 99499 UNLISTED E&M SERVICE: CPT | Mod: S$GLB,,, | Performed by: INTERNAL MEDICINE

## 2018-06-06 PROCEDURE — 99999 PR PBB SHADOW E&M-EST. PATIENT-LVL III: CPT | Mod: PBBFAC,,, | Performed by: INTERNAL MEDICINE

## 2018-06-06 PROCEDURE — 99214 OFFICE O/P EST MOD 30 MIN: CPT | Mod: S$GLB,,, | Performed by: INTERNAL MEDICINE

## 2018-06-06 RX ORDER — LEVOTHYROXINE SODIUM 25 UG/1
25 TABLET ORAL DAILY
Qty: 30 TABLET | Refills: 11 | Status: SHIPPED | OUTPATIENT
Start: 2018-06-06 | End: 2018-10-18 | Stop reason: SDUPTHER

## 2018-06-06 RX ORDER — TEMAZEPAM 15 MG/1
15 CAPSULE ORAL NIGHTLY PRN
Qty: 30 CAPSULE | Refills: 5 | Status: SHIPPED | OUTPATIENT
Start: 2018-06-06 | End: 2018-06-17 | Stop reason: SDUPTHER

## 2018-06-06 NOTE — PROGRESS NOTES
Subjective:      Patient ID: Viri Carpio is a 89 y.o. female.    Chief Complaint: Follow-up (3 month follow up, patient is seeing Dr. Rodriges for corpal tunnal in both hands); Pain (pain in both shoulders off and on 2-3 months); and Results (lab results)    HPI: 89 y.o. White female , here for resultsa and complaining of pain in both her hands, and her right shoulder.  She has carpal tunnel, and is supposed to have surgery on her right wrist soon. However, today her left hasnd is very swollen and painful.  Has been taking Advil and tylenol, and also was given 1 script by [facility] for Mobic, but this has run out.  Additionally is very fatigued, but also not sleeping well at night.    Reviewed labs:     Recent Labs  Lab Result Units 05/31/18  0913   TSH uIU/mL 4.620*   HDL mg/dL 38*   Cholesterol mg/dL 131   Triglycerides mg/dL 73   HDL/Chol Ratio % 29.0   Non-HDL Cholesterol mg/dL 93       Recent Labs  Lab Result Units 05/31/18  0913   Total Cholesterol/HDL Ratio  3.4     No results for input(s): KETONESU, OCCULTUA, NITRITE, UROBILINOGEN in the last 2160 hours.    Recent Labs  Lab Result Units 05/31/18  0913   WBC K/uL 5.88   RBC M/uL 3.77*   Hemoglobin g/dL 11.3*   Hematocrit % 34.0*   MCH pg 30.0   RDW % 13.6       Recent Labs  Lab Result Units 05/31/18 0913   Platelets K/uL 416*   MPV fL 9.6   Glucose mg/dL 99   BUN, Bld mg/dL 26*   Creatinine mg/dL 1.01   Calcium mg/dL 9.8   Sodium mmol/L 139       Recent Labs  Lab Result Units 05/31/18  0913   Potassium mmol/L 4.2   Chloride mmol/L 106   Total Protein g/dL 7.3   Albumin g/dL 3.7   Total Bilirubin mg/dL 0.5   AST U/L 23   Alkaline Phosphatase U/L 128*       Recent Labs  Lab Result Units 05/31/18  0913   CO2 mmol/L 21*   ALT U/L 22   Anion Gap mmol/L 12   eGFR if non African American mL/min/1.73 m^2 49.1*   MCV fL 90         Review of Systems   Constitutional: Positive for fatigue.   Respiratory: Negative.    Cardiovascular: Negative.    Gastrointestinal:  "Negative.    Genitourinary: Negative.    Musculoskeletal: Positive for arthralgias and joint swelling.   Psychiatric/Behavioral: Positive for sleep disturbance.       Objective:   /60 (BP Location: Right arm, Patient Position: Sitting, BP Method: Large (Manual))   Pulse 60   Resp 16   Ht 5' 3" (1.6 m)   Wt 57.7 kg (127 lb 4.8 oz)   SpO2 96%   BMI 22.55 kg/m²     Physical Exam   Constitutional: She is oriented to person, place, and time. She appears well-developed and well-nourished.   HENT:   Head: Normocephalic and atraumatic.   Nose: Nose normal.   Mouth/Throat: Oropharynx is clear and moist.   Eyes: Conjunctivae and EOM are normal.   Cardiovascular: Normal rate, regular rhythm and normal heart sounds.    Pulmonary/Chest: Effort normal and breath sounds normal.   Abdominal: Soft. Bowel sounds are normal.   Musculoskeletal:        Right shoulder: She exhibits decreased range of motion, tenderness, pain and spasm.        Arms:  Entire hand swollen, roseline all PIP joints   Neurological: She is alert and oriented to person, place, and time.   Skin: Skin is warm and dry.   Psychiatric: She has a normal mood and affect. Her behavior is normal.   Nursing note and vitals reviewed.      Assessment:     1. Hypothyroidism due to acquired atrophy of thyroid    2. Insomnia, unspecified type    3. Acute pain of right shoulder    4. CKD (chronic kidney disease) stage 3, GFR 30-59 ml/min    Perhaps when she sees Ortho in 3 days this shoulder can be injected.  Meanwhile, only use tylenol for pain, in view of CKD.  Plan:     Hypothyroidism due to acquired atrophy of thyroid  -     levothyroxine (SYNTHROID) 25 MCG tablet; Take 1 tablet (25 mcg total) by mouth once daily.  Dispense: 30 tablet; Refill: 11    Insomnia, unspecified type  -     temazepam (RESTORIL) 15 mg Cap; Take 1 capsule (15 mg total) by mouth nightly as needed.  Dispense: 30 capsule; Refill: 5    Acute pain of right shoulder    CKD (chronic kidney disease) " stage 3, GFR 30-59 ml/min

## 2018-06-17 DIAGNOSIS — G47.00 INSOMNIA, UNSPECIFIED TYPE: ICD-10-CM

## 2018-06-18 RX ORDER — TEMAZEPAM 15 MG/1
CAPSULE ORAL
Qty: 30 CAPSULE | Refills: 4 | Status: SHIPPED | OUTPATIENT
Start: 2018-06-18 | End: 2018-10-18 | Stop reason: SDUPTHER

## 2018-07-16 ENCOUNTER — TELEPHONE (OUTPATIENT)
Dept: INTERNAL MEDICINE | Facility: CLINIC | Age: 83
End: 2018-07-16

## 2018-07-16 NOTE — TELEPHONE ENCOUNTER
----- Message from Laura Hutchinson sent at 7/16/2018  2:37 PM CDT -----  Contact: [phone]  Patient requesting to speak with you regarding the lab work that she requested to have sent. Please advise.

## 2018-08-01 DIAGNOSIS — I73.9 PERIPHERAL ARTERY DISEASE: ICD-10-CM

## 2018-08-01 RX ORDER — CILOSTAZOL 100 MG/1
TABLET ORAL
Qty: 180 TABLET | Refills: 1 | Status: SHIPPED | OUTPATIENT
Start: 2018-08-01 | End: 2018-10-26 | Stop reason: ALTCHOICE

## 2018-08-30 ENCOUNTER — TELEPHONE (OUTPATIENT)
Dept: INTERNAL MEDICINE | Facility: CLINIC | Age: 83
End: 2018-08-30

## 2018-08-30 DIAGNOSIS — E11.9 TYPE 2 DIABETES MELLITUS WITHOUT COMPLICATION, WITHOUT LONG-TERM CURRENT USE OF INSULIN: ICD-10-CM

## 2018-08-30 DIAGNOSIS — I10 BENIGN ESSENTIAL HTN: ICD-10-CM

## 2018-08-30 DIAGNOSIS — E03.4 HYPOTHYROIDISM DUE TO ACQUIRED ATROPHY OF THYROID: ICD-10-CM

## 2018-08-30 DIAGNOSIS — E78.5 HYPERLIPIDEMIA, UNSPECIFIED HYPERLIPIDEMIA TYPE: Primary | ICD-10-CM

## 2018-09-05 ENCOUNTER — TELEPHONE (OUTPATIENT)
Dept: INTERNAL MEDICINE | Facility: CLINIC | Age: 83
End: 2018-09-05

## 2018-09-05 ENCOUNTER — OFFICE VISIT (OUTPATIENT)
Dept: INTERNAL MEDICINE | Facility: CLINIC | Age: 83
End: 2018-09-05
Payer: MEDICARE

## 2018-09-05 VITALS
HEIGHT: 63 IN | BODY MASS INDEX: 21.91 KG/M2 | HEART RATE: 45 BPM | OXYGEN SATURATION: 96 % | WEIGHT: 123.69 LBS | SYSTOLIC BLOOD PRESSURE: 90 MMHG | DIASTOLIC BLOOD PRESSURE: 50 MMHG | TEMPERATURE: 98 F

## 2018-09-05 DIAGNOSIS — I10 BENIGN ESSENTIAL HTN: ICD-10-CM

## 2018-09-05 DIAGNOSIS — E11.9 TYPE 2 DIABETES MELLITUS WITHOUT COMPLICATION, WITHOUT LONG-TERM CURRENT USE OF INSULIN: Primary | ICD-10-CM

## 2018-09-05 DIAGNOSIS — E78.5 HYPERLIPIDEMIA, UNSPECIFIED HYPERLIPIDEMIA TYPE: ICD-10-CM

## 2018-09-05 DIAGNOSIS — I73.9 PERIPHERAL ARTERY DISEASE: ICD-10-CM

## 2018-09-05 DIAGNOSIS — G47.00 INSOMNIA, UNSPECIFIED TYPE: ICD-10-CM

## 2018-09-05 PROCEDURE — 99499 UNLISTED E&M SERVICE: CPT | Mod: S$GLB,,, | Performed by: INTERNAL MEDICINE

## 2018-09-05 PROCEDURE — 99999 PR PBB SHADOW E&M-EST. PATIENT-LVL III: CPT | Mod: PBBFAC,,, | Performed by: INTERNAL MEDICINE

## 2018-09-05 PROCEDURE — 99214 OFFICE O/P EST MOD 30 MIN: CPT | Mod: S$PBB,,, | Performed by: INTERNAL MEDICINE

## 2018-09-05 PROCEDURE — 99213 OFFICE O/P EST LOW 20 MIN: CPT | Mod: PBBFAC,PN | Performed by: INTERNAL MEDICINE

## 2018-09-05 PROCEDURE — 1101F PT FALLS ASSESS-DOCD LE1/YR: CPT | Mod: CPTII,,, | Performed by: INTERNAL MEDICINE

## 2018-09-05 NOTE — PROGRESS NOTES
Subjective:      Patient ID: Viri Carpio is a 89 y.o. female.    Chief Complaint: Diabetes (f/u) and Hypertension (f/u)    HPI: 89 y.o. White female, here with family.  States she has had no hypoglycemic episodes.  No weakness.  No vision problems.  No CP,palpitations.  No SOB,wheezing.  No headaches.  No edema.  No N,V,D,C.  No dysuria.     Results:  08/30/18 1019 TSH 2.710 - Final result   08/30/18 1019 HDL 46 - Final result   08/30/18 1019 CHOL 148 - Final result   08/30/18 1019 TRIG 100 - Final result   08/30/18 1019 LDLCALC 82.0 - Final result   08/30/18 1019 CHOLHDL 31.1 - Final result   08/30/18 1019 NONHDLCHOL 102 - Final result   08/30/18 1019 TOTALCHOLEST 3.2 - Final result   08/30/18 1019 HGBA1C 5.4 - Final result   08/30/18 1009 COLORU Yellow - Final result   08/30/18 1009 APPEARANCEUA Clear - Final result   08/30/18 1009 SPECGRAV 1.025 - Final result   08/30/18 1009 PHUR 6.0 - Final result   08/30/18 1009 KETONESU Negative - Final result   08/30/18 1009 OCCULTUA Negative - Final result   08/30/18 1009 NITRITE Negative - Final result   08/30/18 1009 UROBILINOGEN Negative - Final result   08/30/18 1009 LEUKOCYTESUR Negative - Final result   08/30/18 1019 WBC 7.75 - Final result   08/30/18 1019 RBC 3.76 Abnormal  Low Final result   08/30/18 1019 HGB 11.4 Abnormal  Low Final result   08/30/18 1019 HCT 34.6 Abnormal  Low Final result   08/30/18 1019 MCH 30.3 - Final result   08/30/18 1019 RDW 14.4 - Final result   08/30/18 1019  - Final result   08/30/18 1019 MPV 10.1 - Final result   08/30/18 1019  - Final result   08/30/18 1019 BUN 19 Abnormal  High Final result   08/30/18 1019 CREATININE 0.77 - Final result   08/30/18 1019 CALCIUM 9.6 - Final result   08/30/18 1019  - Final result   08/30/18 1019 K 4.2 - Final result   08/30/18 1019  Abnormal  High Final result   08/30/18 1019 PROT 7.5 - Final result   08/30/18 1019 ALBUMIN 3.8 - Final result   08/30/18 1019 BILITOT 0.6 -  "Final result   08/30/18 1019 AST 15 - Final result   08/30/18 1019 ALKPHOS 106 - Final result   08/30/18 1019 CO2 18 Abnormal  Low Final result   08/30/18 1019 ALT 13 - Final result   08/30/18 1019 ANIONGAP 11 - Final result   08/30/18 1019 EGFRNONAA >60.0 - Final result   08/30/18 1019 ESTGFRAFRICA >60.0 - Final result   08/30/18 1019 MCV 92         Review of Systems    Objective:   BP (!) 90/50 (BP Location: Left arm, Patient Position: Sitting, BP Method: Medium (Manual))   Pulse (!) 45   Temp 98.2 °F (36.8 °C) (Oral)   Ht 5' 3" (1.6 m)   Wt 56.1 kg (123 lb 10.9 oz)   SpO2 96%   BMI 21.91 kg/m²     Physical Exam   Constitutional: She is oriented to person, place, and time. She appears well-developed and well-nourished.   HENT:   Head: Normocephalic and atraumatic.   Right Ear: External ear normal.   Left Ear: External ear normal.   Nose: Nose normal.   Mouth/Throat: Oropharynx is clear and moist.   Eyes: Conjunctivae and EOM are normal.   Neck: Neck supple. No JVD present.   Cardiovascular: Normal rate, regular rhythm and normal heart sounds.   Pulmonary/Chest: Effort normal and breath sounds normal.   Abdominal: Soft. Bowel sounds are normal.   Musculoskeletal: Normal range of motion. She exhibits no edema.   Neurological: She is alert and oriented to person, place, and time.   Skin: Skin is warm and dry.   Psychiatric: She has a normal mood and affect. Her behavior is normal.   Nursing note and vitals reviewed.      Assessment:     1. Type 2 diabetes mellitus without complication, without long-term current use of insulin    2. Benign essential HTN    3. Hyperlipidemia, unspecified hyperlipidemia type    4. Peripheral artery disease      Plan:     Type 2 diabetes mellitus without complication, without long-term current use of insulin  Stop metformin.  Drink plenty of water.  Eat correctly.  Will recheck labs in 3 months.  Benign essential HTN    Hyperlipidemia, unspecified hyperlipidemia type    Peripheral " artery disease

## 2018-09-05 NOTE — TELEPHONE ENCOUNTER
----- Message from Magali Hernadez sent at 9/5/2018 10:32 AM CDT -----  Contact: Self [phone]  Patient is requesting a written RX on her medication temazepam (RESTORIL) 15 mg Cap 30 capsule. Please advice

## 2018-09-05 NOTE — TELEPHONE ENCOUNTER
[facility] should have refill [facility]. Spoke to [facility] pt has refill on file. pts daughter notified.

## 2018-09-19 ENCOUNTER — PES CALL (OUTPATIENT)
Dept: ADMINISTRATIVE | Facility: CLINIC | Age: 83
End: 2018-09-19

## 2018-10-17 ENCOUNTER — OFFICE VISIT (OUTPATIENT)
Dept: INTERNAL MEDICINE | Facility: CLINIC | Age: 83
End: 2018-10-17
Payer: MEDICARE

## 2018-10-17 ENCOUNTER — CLINICAL SUPPORT (OUTPATIENT)
Dept: FAMILY MEDICINE | Facility: CLINIC | Age: 83
End: 2018-10-17
Payer: MEDICARE

## 2018-10-17 ENCOUNTER — OFFICE VISIT (OUTPATIENT)
Dept: CARDIOLOGY | Facility: CLINIC | Age: 83
End: 2018-10-17
Payer: MEDICARE

## 2018-10-17 VITALS
RESPIRATION RATE: 18 BRPM | DIASTOLIC BLOOD PRESSURE: 60 MMHG | OXYGEN SATURATION: 92 % | HEART RATE: 88 BPM | HEIGHT: 63 IN | WEIGHT: 121.5 LBS | BODY MASS INDEX: 21.53 KG/M2 | SYSTOLIC BLOOD PRESSURE: 122 MMHG

## 2018-10-17 VITALS
WEIGHT: 120.31 LBS | HEIGHT: 63 IN | SYSTOLIC BLOOD PRESSURE: 116 MMHG | BODY MASS INDEX: 21.32 KG/M2 | OXYGEN SATURATION: 91 % | HEART RATE: 79 BPM | DIASTOLIC BLOOD PRESSURE: 74 MMHG

## 2018-10-17 DIAGNOSIS — E78.2 MIXED HYPERLIPIDEMIA: ICD-10-CM

## 2018-10-17 DIAGNOSIS — I10 BENIGN ESSENTIAL HTN: ICD-10-CM

## 2018-10-17 DIAGNOSIS — R06.02 SOB (SHORTNESS OF BREATH) ON EXERTION: ICD-10-CM

## 2018-10-17 DIAGNOSIS — I48.91 ATRIAL FIBRILLATION, UNSPECIFIED TYPE: Primary | ICD-10-CM

## 2018-10-17 PROCEDURE — 93005 ELECTROCARDIOGRAM TRACING: CPT | Mod: PBBFAC,PN | Performed by: INTERNAL MEDICINE

## 2018-10-17 PROCEDURE — 93005 ELECTROCARDIOGRAM TRACING: CPT | Mod: PBBFAC,PN | Performed by: STUDENT IN AN ORGANIZED HEALTH CARE EDUCATION/TRAINING PROGRAM

## 2018-10-17 PROCEDURE — 1101F PT FALLS ASSESS-DOCD LE1/YR: CPT | Mod: CPTII,,, | Performed by: INTERNAL MEDICINE

## 2018-10-17 PROCEDURE — 99999 PR PBB SHADOW E&M-EST. PATIENT-LVL IV: CPT | Mod: PBBFAC,,, | Performed by: INTERNAL MEDICINE

## 2018-10-17 PROCEDURE — 99214 OFFICE O/P EST MOD 30 MIN: CPT | Mod: S$PBB,,, | Performed by: INTERNAL MEDICINE

## 2018-10-17 PROCEDURE — 99205 OFFICE O/P NEW HI 60 MIN: CPT | Mod: S$PBB,,, | Performed by: INTERNAL MEDICINE

## 2018-10-17 PROCEDURE — 93010 ELECTROCARDIOGRAM REPORT: CPT | Mod: S$PBB,,, | Performed by: STUDENT IN AN ORGANIZED HEALTH CARE EDUCATION/TRAINING PROGRAM

## 2018-10-17 PROCEDURE — 99214 OFFICE O/P EST MOD 30 MIN: CPT | Mod: PBBFAC,PN,25 | Performed by: INTERNAL MEDICINE

## 2018-10-17 PROCEDURE — 99214 OFFICE O/P EST MOD 30 MIN: CPT | Mod: PBBFAC,27,PN,25 | Performed by: INTERNAL MEDICINE

## 2018-10-17 PROCEDURE — 99499 UNLISTED E&M SERVICE: CPT | Mod: S$GLB,,, | Performed by: INTERNAL MEDICINE

## 2018-10-17 RX ORDER — DICLOFENAC SODIUM 10 MG/G
GEL TOPICAL
Refills: 1 | COMMUNITY
Start: 2018-10-08

## 2018-10-17 RX ORDER — FUROSEMIDE 20 MG/1
20 TABLET ORAL 2 TIMES DAILY
Qty: 60 TABLET | Refills: 11 | Status: SHIPPED | OUTPATIENT
Start: 2018-10-17 | End: 2018-10-18 | Stop reason: SDUPTHER

## 2018-10-17 NOTE — PROGRESS NOTES
[facility]    Chief Complaint   Patient presents with    Abnormal ECG     Afib, Ref by Dr Casiano    Edema     Feet/ Ankle    Shortness of Breath     on exertion       Patient ID: Viri Carpio is a 89 y.o. female with a past medical history of HLD, who presents for an initial appointment.  Pertinent history/events are as follows:     -Pt kindly referred by Dr. Casiano for evaluation of new onset Afib, SOB, LE edema.    HPI:  Mrs. Carpio is accompanied by her daughter.  She reports onset of ATKINSON beginning approximately 2 weeks ago.  Around the same time, she noticed swelling in both legs/ankles.  She has no chest pain.  EKG done today by PCP shows new onset atrial fibrillation with rate of 83 bpm.  .      Past Medical History:   Diagnosis Date    Depression     Diabetes mellitus, type 2     Hyperlipidemia     Insomnia     Peripheral artery disease      Past Surgical History:   Procedure Laterality Date    APPENDECTOMY       Social History     Socioeconomic History    Marital status:      Spouse name: Not on file    Number of children: Not on file    Years of education: Not on file    Highest education level: Not on file   Social Needs    Financial resource strain: Not on file    Food insecurity - worry: Not on file    Food insecurity - inability: Not on file    Transportation needs - medical: Not on file    Transportation needs - non-medical: Not on file   Occupational History    Not on file   Tobacco Use    Smoking status: Former Smoker    Smokeless tobacco: Former User   Substance and Sexual Activity    Alcohol use: No     Alcohol/week: 0.0 oz    Drug use: No    Sexual activity: Not on file   Other Topics Concern    Not on file   Social History Narrative    Not on file     No family history on file.    Review of patient's allergies indicates:  No Known Allergies       Medication List           Accurate as of 10/17/18  3:43 PM. If you have any questions, ask your  nurse or doctor.               START taking these medications    * apixaban 5 mg Tab  Commonly known as:  ELIQUIS  Take 1 tablet (5 mg total) by mouth 2 (two) times daily.  Started by:  Alesha Casiano MD     * apixaban 5 mg Tab  Commonly known as:  ELIQUIS  Take 1 tablet (5 mg total) by mouth 2 (two) times daily.  Started by:  Alesha Casiano MD         * This list has 2 medication(s) that are the same as other medications prescribed for you. Read the directions carefully, and ask your doctor or other care provider to review them with you.            CONTINUE taking these medications    amlodipine-olmesartan 5-40 mg per tablet  Commonly known as:  JIGNA  Take 1 tablet by mouth once daily.     aspirin 81 MG EC tablet  Commonly known as:  ECOTRIN     atenolol 100 MG tablet  Commonly known as:  TENORMIN  Take 1 tablet (100 mg total) by mouth once daily.     cilostazol 100 MG Tab  Commonly known as:  PLETAL  TAKE 1 TABLET TWICE DAILY     escitalopram oxalate 10 MG tablet  Commonly known as:  LEXAPRO  Take 1 tablet (10 mg total) by mouth once daily.     FLUAD [id number] (65 YR UP)(PF) 45 mcg (15 mcg x 3)/0.5 mL Syrg  Generic drug:  flu vac 2018 [id number](PF)     levothyroxine 25 MCG tablet  Commonly known as:  SYNTHROID  Take 1 tablet (25 mcg total) by mouth once daily.     lovastatin 20 MG tablet  Commonly known as:  MEVACOR  Take 1 tablet (20 mg total) by mouth every evening.     nystatin cream  Commonly known as:  MYCOSTATIN  APPLY TO AFFECTED AREAS THREE TIMES A DAY WITH OTHER CREAM     nystatin-triamcinolone cream  Commonly known as:  MYCOLOG II     temazepam 15 mg Cap  Commonly known as:  RESTORIL  TAKE ONE CAPSULE BY MOUTH AT BEDTIME AS NEEDED FOR SLEEP     VOLTAREN 1 % Gel  Generic drug:  diclofenac sodium           Where to Get Your Medications      These medications were sent to [facility] - [place], [place] - [address]   [address], [facility] [address]    Phone:   "[phone]   · apixaban 5 mg Tab     These medications were sent to [id number] - [place], [place] - [address], [address]  [address] [facility], [address], [place] [address]    Phone:  [phone]   · apixaban 5 mg Tab         Review of Systems  Constitution: Denies chills, fever, and sweats.  HENT: Denies headaches or blurry vision.  Cardiovascular: Denies chest pain or irregular heart beat.  Respiratory: Denies cough or shortness of breath.  Gastrointestinal: Denies abdominal pain, nausea, or vomiting.  Musculoskeletal: Denies muscle cramps.  Neurological: Denies dizziness or focal weakness.  Psychiatric/Behavioral: Normal mental status.  Hematologic/Lymphatic: Denies bleeding problem or easy bruising/bleeding.  Skin: Denies rash or suspicious lesions    Physical Examination  /74 (BP Location: Left arm, Patient Position: Sitting, BP Method: Large (Manual))   Pulse 79   Ht 5' 3" (1.6 m)   Wt 54.6 kg (120 lb 4.8 oz)   SpO2 (!) 91%   BMI 21.31 kg/m²     Constitutional: No acute distress, conversant  HEENT: Sclera anicteric, Pupils equal, round and reactive to light, extraocular motions intact, Oropharynx clear  Neck: No JVD, no carotid bruits  Cardiovascular: regular rate and rhythm, no murmur, rubs or gallops, normal S1/S2  Pulmonary: Clear to auscultation bilaterally  Abdominal: Abdomen soft, nontender, nondistended, positive bowel sounds  Extremities: No lower extremity edema,   Pulses:  Carotid pulses are 2+ on the right side, and 2+ on the left side.  Radial pulses are 2+ on the right side, and 2+ on the left side.   Femoral pulses are 2+ on the right side, and 2+ on the left side.  Popliteal pulses are 2+ on the right side, and 2+ on the left side.   Dorsalis pedis pulses are 2+ on the right side, and 2+ on the left side.   Posterior tibial pulses are 2+ on the right side, and 2+ on the left side.    Skin: No ecchymosis, erythema, or ulcers  Psych: Alert and oriented x 3, appropriate " affect  Neuro: CNII-XII intact, no focal deficits    Labs:  Most Recent Data  CBC:   Lab Results   Component Value Date    WBC 7.75 08/30/2018    HGB 11.4 (L) 08/30/2018    HCT 34.6 (L) 08/30/2018     08/30/2018    MCV 92 08/30/2018    RDW 14.4 08/30/2018     BMP:   Lab Results   Component Value Date     08/30/2018    K 4.2 08/30/2018     (H) 08/30/2018    CO2 18 (L) 08/30/2018    BUN 19 (H) 08/30/2018    CREATININE 0.77 08/30/2018     08/30/2018    CALCIUM 9.6 08/30/2018     LFTS;   Lab Results   Component Value Date    PROT 7.5 08/30/2018    ALBUMIN 3.8 08/30/2018    BILITOT 0.6 08/30/2018    AST 15 08/30/2018    ALKPHOS 106 08/30/2018    ALT 13 08/30/2018     COAGS: No results found for: INR, PROTIME, PTT  FLP:   Lab Results   Component Value Date    CHOL 148 08/30/2018    HDL 46 08/30/2018    LDLCALC 82.0 08/30/2018    TRIG 100 08/30/2018    CHOLHDL 31.1 08/30/2018     CARDIAC: No results found for: TROPONINI, CKMB, BNP    EKG 10/17/2018:  Atrial fibrillation with rate of 84 bpm     Assessment/Plan:  Viri Carpio is a 89 y.o. female with a past medical history of HLD, who presents for an initial appointment.    1. New Onset Atrial Fibrillation- Pt well rate controlled and asymptomatic on atenolol 100 mg daily.  Will pursue rate control strategy at this time.  Pt is >81 hyears old and weighs 55 kg.  Decrease Eliquis to 2.5 mg bid.  Place 30 day event monitor.  Check echo.  CXR, cmp, proBNP level today.      2. PAD- Stable.  Continue ASA and statin.      Follow up in 1 week      Total duration of face to face visit time 30 minutes.  Total time spent counseling greater than fifty percent of total visit time.  Counseling included discussion regarding imaging findings, diagnosis, possibilities, treatment options, risks and benefits.  The patient had many questions regarding the options and long-term effects.    Chucky Palacios MD, PhD  Interventional Cardiology

## 2018-10-17 NOTE — PROGRESS NOTES
"Subjective:      Patient ID: Viri Carpio is a 89 y.o. female.    Chief Complaint: Foot Swelling and Shortness of Breath    HPI: 89 y.o. White female, several issues:  -at last visit, the metformin was discontinued, due to age and a completely normal/low HGBA1C.  -had severe pain in both knees, so she recently went to [facility] for injections in both  -just recently developed minimal swelling of her lower legs.    -suddenly 2 weeks ago, while walking developed a fluttering in her chest,perhaps with mild SOB,   but no diaphoresis,weakness or SOB.  She sat down and this resolved, only to return again several days later.  Never associated with stress.  Never at night or with foods.      Review of Systems   Constitutional: Negative.    HENT: Negative.    Eyes: Negative.    Respiratory: Negative for cough, choking, chest tightness, shortness of breath and wheezing.    Cardiovascular: Positive for palpitations and leg swelling. Negative for chest pain.   Gastrointestinal: Negative.    Endocrine: Negative.    Genitourinary: Negative.    Musculoskeletal: Negative.    Allergic/Immunologic: Negative.    Neurological: Negative for dizziness, weakness, light-headedness, numbness and headaches.   Psychiatric/Behavioral: Negative.        Objective:   /60 (BP Location: Left arm, Patient Position: Sitting, BP Method: Large (Manual))   Pulse 88   Resp 18   Ht 5' 3" (1.6 m)   Wt 55.1 kg (121 lb 8 oz)   SpO2 (!) 92%   BMI 21.52 kg/m²     Physical Exam   Constitutional: She is oriented to person, place, and time. She appears well-developed and well-nourished.   HENT:   Head: Normocephalic and atraumatic.   Eyes: Conjunctivae and EOM are normal.   Neck: No JVD present.   Cardiovascular: An irregularly irregular rhythm present.   Pulmonary/Chest: Effort normal and breath sounds normal.   Abdominal: Soft. Bowel sounds are normal.   Musculoskeletal: Edema: trace to mid leg.   No homans   Neurological: She is alert and " oriented to person, place, and time.   Skin: Skin is warm and dry.   Psychiatric: She has a normal mood and affect. Her behavior is normal.   Nursing note and vitals reviewed.      Assessment:     1. Atrial fibrillation, unspecified type    Has nl renal function; never had an MI,TIA,GI bleed.  Rate seems controlled.  Have discussed with cardio, and will refer.  Plan:     Atrial fibrillation, unspecified type  -     IN OFFICE EKG 12-LEAD (to [facility])  -     Ambulatory referral to Cardiology  -     apixaban (ELIQUIS) 5 mg Tab; Take 1 tablet (5 mg total) by mouth 2 (two) times daily.  Dispense: 60 tablet; Refill: 0  -     apixaban (ELIQUIS) 5 mg Tab; Take 1 tablet (5 mg total) by mouth 2 (two) times daily.  Dispense: 180 tablet; Refill: 1

## 2018-10-17 NOTE — PATIENT INSTRUCTIONS
Assessment/Plan:  Viri Carpio is a 89 y.o. female with a past medical history of HLD, who presents for an initial appointment.    1. New Onset Atrial Fibrillation- Pt well rate controlled and asymptomatic on atenolol 100 mg daily.  Will pursue rate control strategy at this time.  Pt is >81 hyears old and weighs 55 kg.  Decrease Eliquis to 2.5 mg bid.  Place 30 day event monitor.  Check echo.  CXR, cmp, proBNP level today.      2. PAD- Stable.  Continue ASA and statin.      Follow up in 1 week

## 2018-10-17 NOTE — LETTER
October 17, 2018      Alesha Casiano MD  [address]  [address]   [facility] [address]           [facility]  [address] [place]   [facility] [address]  Phone: [phone]  Fax: [phone]          Patient: Viri Carpio   MR Number: [id number]   YOB: 1927   Date of Visit: 10/17/2018       Dear Dr. Alesha Casiano:    Thank you for referring Viri Carpio to me for evaluation. Attached you will find relevant portions of my assessment and plan of care.    If you have questions, please do not hesitate to call me. I look forward to following Viri Carpio along with you.    Sincerely,    Chucky Palacios MD PhD    Enclosure  CC:  No Recipients    If you would like to receive this communication electronically, please contact [email or web address] or [phone] to request more information on [employer] access.    For providers and/or their staff who would like to refer a patient to [facility], please contact us through our one-stop-shop provider referral line, [facility], at [phone].    If you feel you have received this communication in error or would no longer like to receive these types of communications, please e-mail [email or web address]

## 2018-10-18 ENCOUNTER — TELEPHONE (OUTPATIENT)
Dept: CARDIOLOGY | Facility: CLINIC | Age: 83
End: 2018-10-18

## 2018-10-18 DIAGNOSIS — E78.5 HYPERLIPIDEMIA, UNSPECIFIED HYPERLIPIDEMIA TYPE: ICD-10-CM

## 2018-10-18 DIAGNOSIS — F32.A DEPRESSION, UNSPECIFIED DEPRESSION TYPE: ICD-10-CM

## 2018-10-18 DIAGNOSIS — I10 BENIGN ESSENTIAL HTN: ICD-10-CM

## 2018-10-18 DIAGNOSIS — E03.4 HYPOTHYROIDISM DUE TO ACQUIRED ATROPHY OF THYROID: ICD-10-CM

## 2018-10-18 DIAGNOSIS — G47.00 INSOMNIA, UNSPECIFIED TYPE: ICD-10-CM

## 2018-10-18 NOTE — TELEPHONE ENCOUNTER
----- Message from Josey Muhammad sent at 10/18/2018 10:42 AM CDT -----  Contact: self/[phone]  Patient's daughter called about getting refills on all the medications you prescribe for her.   She does not know the names but stated [employer] is sending your office a request as well.    Please call and advise when done.

## 2018-10-18 NOTE — TELEPHONE ENCOUNTER
----- Message from Josey Muhammad sent at 10/18/2018 10:45 AM CDT -----  Contact: Favio (daughter)/[phone]  Patient's daughter is calling to get results from recent blood test to see if it is okay for her to begin taking her fluid pills.      Please call and advise.

## 2018-10-19 RX ORDER — LOVASTATIN 20 MG/1
TABLET ORAL
Qty: 90 TABLET | Refills: 1 | Status: SHIPPED | OUTPATIENT
Start: 2018-10-19 | End: 2018-12-11 | Stop reason: SDUPTHER

## 2018-10-19 RX ORDER — LOVASTATIN 20 MG/1
20 TABLET ORAL NIGHTLY
Qty: 90 TABLET | Refills: 3 | Status: SHIPPED | OUTPATIENT
Start: 2018-10-19 | End: 2018-10-19

## 2018-10-19 RX ORDER — TEMAZEPAM 15 MG/1
15 CAPSULE ORAL NIGHTLY PRN
Qty: 30 CAPSULE | Refills: 4 | Status: SHIPPED | OUTPATIENT
Start: 2018-10-19 | End: 2019-02-27 | Stop reason: SDUPTHER

## 2018-10-19 RX ORDER — AMLODIPINE AND OLMESARTAN MEDOXOMIL 5; 40 MG/1; MG/1
TABLET ORAL
Qty: 90 TABLET | Refills: 1 | Status: SHIPPED | OUTPATIENT
Start: 2018-10-19 | End: 2018-10-26 | Stop reason: ALTCHOICE

## 2018-10-19 RX ORDER — ATENOLOL 100 MG/1
TABLET ORAL
Qty: 90 TABLET | Refills: 1 | Status: SHIPPED | OUTPATIENT
Start: 2018-10-19 | End: 2018-10-26 | Stop reason: ALTCHOICE

## 2018-10-19 RX ORDER — AMLODIPINE AND OLMESARTAN MEDOXOMIL 5; 40 MG/1; MG/1
1 TABLET ORAL DAILY
Qty: 90 TABLET | Refills: 1 | Status: SHIPPED | OUTPATIENT
Start: 2018-10-19 | End: 2018-10-26 | Stop reason: ALTCHOICE

## 2018-10-19 RX ORDER — ESCITALOPRAM OXALATE 10 MG/1
TABLET ORAL
Qty: 90 TABLET | Refills: 1 | Status: SHIPPED | OUTPATIENT
Start: 2018-10-19 | End: 2019-09-12 | Stop reason: SDUPTHER

## 2018-10-19 RX ORDER — LEVOTHYROXINE SODIUM 25 UG/1
25 TABLET ORAL DAILY
Qty: 90 TABLET | Refills: 3 | Status: SHIPPED | OUTPATIENT
Start: 2018-10-19 | End: 2019-09-12 | Stop reason: SDUPTHER

## 2018-10-19 RX ORDER — ATENOLOL 100 MG/1
100 TABLET ORAL DAILY
Qty: 90 TABLET | Refills: 3 | Status: SHIPPED | OUTPATIENT
Start: 2018-10-19 | End: 2018-10-26 | Stop reason: ALTCHOICE

## 2018-10-19 RX ORDER — ESCITALOPRAM OXALATE 10 MG/1
10 TABLET ORAL DAILY
Qty: 90 TABLET | Refills: 3 | Status: SHIPPED | OUTPATIENT
Start: 2018-10-19 | End: 2018-10-19

## 2018-10-19 RX ORDER — FUROSEMIDE 20 MG/1
20 TABLET ORAL 2 TIMES DAILY
Qty: 180 TABLET | Refills: 3 | Status: ON HOLD | OUTPATIENT
Start: 2018-10-19 | End: 2019-02-19 | Stop reason: SDUPTHER

## 2018-10-24 ENCOUNTER — TELEPHONE (OUTPATIENT)
Dept: CARDIOLOGY | Facility: CLINIC | Age: 83
End: 2018-10-24

## 2018-10-24 DIAGNOSIS — I50.41 ACUTE COMBINED SYSTOLIC AND DIASTOLIC CONGESTIVE HEART FAILURE: Primary | ICD-10-CM

## 2018-10-24 NOTE — TELEPHONE ENCOUNTER
----- Message from Uli Fulton MD sent at 10/24/2018 12:38 PM CDT -----  Contact: daughter Gricel [phone]  Hopefully Dr. Palacios has addressed the issue      ZN  ----- Message -----  From: Татьяна Horan MA  Sent: 10/19/2018   3:49 PM  To: Uli Fulton MD        ----- Message -----  From: Karele Olmos MA  Sent: 10/19/2018   3:12 PM  To: Donaldo REYES Staff    Mrs Meredith just started Eliquis 2.5 mg on yesterday night for the first time. She then started having severe leg cramps during the night. She took a second dose today at 10:30 am then started having an itching sensation of her arms and legs..   ----- Message -----  From: Kianna Rodriguez  Sent: 10/19/2018   1:47 PM  To: Philip TAFOYA Staff    Patient's daughter requesting to speak with you regarding side effects from medication apixaban (ELIQUIS) 2.5 mg Tab.

## 2018-10-24 NOTE — TELEPHONE ENCOUNTER
Reached out to pt's daughter    Pt daughter stated that nobody has reached out to her from Dr. Palacios's office in regards to the previous messages     Pt daughter stated that she has been giving the pt Benadryl to help alleviate the itching from the Eliquis and that has helped her out    Pt stated she has also decreased her Lasix to one pill and not two, pt weight has gone down and is no longer feeling any leg cramps    Pt daughter is requesting a call back to address all these concerns and is also requesting that the Eliquis refill be sent to pt mail order pharmacy

## 2018-10-25 ENCOUNTER — TELEPHONE (OUTPATIENT)
Dept: CARDIOLOGY | Facility: CLINIC | Age: 83
End: 2018-10-25

## 2018-10-25 ENCOUNTER — CLINICAL SUPPORT (OUTPATIENT)
Dept: ELECTROPHYSIOLOGY | Facility: CLINIC | Age: 83
End: 2018-10-25
Attending: INTERNAL MEDICINE
Payer: MEDICARE

## 2018-10-25 DIAGNOSIS — I48.91 ATRIAL FIBRILLATION, UNSPECIFIED TYPE: ICD-10-CM

## 2018-10-25 NOTE — TELEPHONE ENCOUNTER
----- Message from Karina López sent at 10/25/2018 12:27 PM CDT -----  Contact: self / [phone]  Patient is returning a call from your office. Please advise

## 2018-10-26 ENCOUNTER — OFFICE VISIT (OUTPATIENT)
Dept: CARDIOLOGY | Facility: CLINIC | Age: 83
End: 2018-10-26
Payer: MEDICARE

## 2018-10-26 VITALS
HEART RATE: 89 BPM | DIASTOLIC BLOOD PRESSURE: 64 MMHG | TEMPERATURE: 98 F | WEIGHT: 116 LBS | OXYGEN SATURATION: 94 % | BODY MASS INDEX: 20.55 KG/M2 | SYSTOLIC BLOOD PRESSURE: 118 MMHG

## 2018-10-26 DIAGNOSIS — I48.19 PERSISTENT ATRIAL FIBRILLATION: ICD-10-CM

## 2018-10-26 DIAGNOSIS — E78.2 MIXED HYPERLIPIDEMIA: ICD-10-CM

## 2018-10-26 DIAGNOSIS — R09.89 DEPRESSED LEFT VENTRICULAR EJECTION FRACTION: ICD-10-CM

## 2018-10-26 DIAGNOSIS — I10 BENIGN ESSENTIAL HTN: Primary | ICD-10-CM

## 2018-10-26 DIAGNOSIS — R06.02 SOB (SHORTNESS OF BREATH) ON EXERTION: ICD-10-CM

## 2018-10-26 DIAGNOSIS — I73.9 PERIPHERAL ARTERY DISEASE: ICD-10-CM

## 2018-10-26 PROBLEM — I48.91 ATRIAL FIBRILLATION: Status: ACTIVE | Noted: 2018-10-26

## 2018-10-26 PROCEDURE — 1101F PT FALLS ASSESS-DOCD LE1/YR: CPT | Mod: CPTII,,, | Performed by: INTERNAL MEDICINE

## 2018-10-26 PROCEDURE — 99215 OFFICE O/P EST HI 40 MIN: CPT | Mod: S$PBB,,, | Performed by: INTERNAL MEDICINE

## 2018-10-26 PROCEDURE — 99999 PR PBB SHADOW E&M-EST. PATIENT-LVL III: CPT | Mod: PBBFAC,,, | Performed by: INTERNAL MEDICINE

## 2018-10-26 PROCEDURE — 99213 OFFICE O/P EST LOW 20 MIN: CPT | Mod: PBBFAC,PN | Performed by: INTERNAL MEDICINE

## 2018-10-26 RX ORDER — METOPROLOL SUCCINATE 100 MG/1
100 TABLET, EXTENDED RELEASE ORAL DAILY
Qty: 30 TABLET | Refills: 11 | Status: SHIPPED | OUTPATIENT
Start: 2018-10-26 | End: 2019-09-23 | Stop reason: SDUPTHER

## 2018-10-26 RX ORDER — OLMESARTAN MEDOXOMIL 40 MG/1
40 TABLET ORAL DAILY
Qty: 90 TABLET | Refills: 3 | Status: SHIPPED | OUTPATIENT
Start: 2018-10-26 | End: 2019-06-25 | Stop reason: SDUPTHER

## 2018-10-26 NOTE — PATIENT INSTRUCTIONS
1. Stop amlodipine (norvasc).  2. Stop atenolol.  Wait 24 hours, then start metoprolol succinate 100 mg daily.  3. Continue olmesartan 40 mg daily  4. Continue lasix 20 mg daily  5. Continue Eliquis 2.5 mg twice daily  6. Check lexiscan stress test to evaluate for ischemia    Follow up in 6 weeks

## 2018-10-26 NOTE — PROGRESS NOTES
[facility]    Chief Complaint   Patient presents with    Results     to review test results       Patient ID: Viri Carpio is a 89 y.o. female with a past medical history of HLD, who presents for a follow-up appointment.  Pertinent history/events are as follows:     -Pt kindly referred by Dr. Casiano for evaluation of new onset Afib, SOB, LE edema.    -At our initial clinic visit on 10/17/2018, Mrs. Carpio is accompanied by her daughter.  She reported onset of ATKINSON beginning approximately 2 weeks ago.  Around the same time, she noticed swelling in both legs/ankles.  She has no chest pain.  EKG done today by PCP shows new onset atrial fibrillation with rate of 83 bpm.    Plan: Pt well rate controlled and asymptomatic on atenolol 100 mg daily.  Will pursue rate control strategy at this time.  Pt is >81 hyears old and weighs 55 kg.  Decrease Eliquis to 2.5 mg bid.  Place 30 day event monitor.  Check echo.  CXR, cmp, proBNP level today.  Continue ASA and statin for PAD, which is stable.     -Studies from 10/17/2018 revealed: proBNP elevated at 2210.  CXR with bilateral perihilar and basilar opacities with interstitial prominence concerning for edema.  There is blunting of the costophrenic angles bilaterally suspicious for pleural fluid.    HPI:   reports feeling much better since our initial visit on 10/17/2018.  Weight has decreased from 123 pounds to 110.  LE edema and SOB resolved.  Repeat CXR from 10/24/2018 shows significant improvement of pulmonary edema.  Cr stable.  Now taking lasix 20 mg daily instead of bid. 30 day event monitor in place.  Echo from 10/22/2018 shows 1 - Mildly to moderately depressed left ventricular systolic function (EF 40-45%). (poor visualization of endocardial borders but EF appear mildly reduce in some views).  impaired LV relaxation, increased LVEDP; normal right ventricular systolic function; estimated PA systolic pressure is 38 mmHg.       Past Medical  History:   Diagnosis Date    Depression     Diabetes mellitus, type 2     Hyperlipidemia     Insomnia     Peripheral artery disease      Past Surgical History:   Procedure Laterality Date    APPENDECTOMY       Social History     Socioeconomic History    Marital status:      Spouse name: Not on file    Number of children: Not on file    Years of education: Not on file    Highest education level: Not on file   Social Needs    Financial resource strain: Not on file    Food insecurity - worry: Not on file    Food insecurity - inability: Not on file    Transportation needs - medical: Not on file    Transportation needs - non-medical: Not on file   Occupational History    Not on file   Tobacco Use    Smoking status: Former Smoker    Smokeless tobacco: Former User   Substance and Sexual Activity    Alcohol use: No     Alcohol/week: 0.0 oz    Drug use: No    Sexual activity: Not on file   Other Topics Concern    Not on file   Social History Narrative    Not on file     No family history on file.    Review of patient's allergies indicates:  No Known Allergies       Medication List           Accurate as of 10/26/18  3:20 PM. If you have any questions, ask your nurse or doctor.               CONTINUE taking these medications    * amlodipine-olmesartan 5-40 mg per tablet  Commonly known as:  JIGNA  Take 1 tablet by mouth once daily.     * amlodipine-olmesartan 5-40 mg per tablet  Commonly known as:  JIGNA  TAKE 1 TABLET EVERY DAY     * apixaban 2.5 mg Tab  Commonly known as:  ELIQUIS  Take 1 tablet (2.5 mg total) by mouth 2 (two) times daily.     * apixaban 2.5 mg Tab  Commonly known as:  ELIQUIS  Take 1 tablet (2.5 mg total) by mouth 2 (two) times daily.     aspirin 81 MG EC tablet  Commonly known as:  ECOTRIN     * atenolol 100 MG tablet  Commonly known as:  TENORMIN  Take 1 tablet (100 mg total) by mouth once daily.     * atenolol 100 MG tablet  Commonly known as:  TENORMIN  TAKE 1 TABLET EVERY  DAY     escitalopram oxalate 10 MG tablet  Commonly known as:  LEXAPRO  TAKE 1 TABLET EVERY DAY     FLUAD 2936-4613 (65 YR UP)(PF) 45 mcg (15 mcg x 3)/0.5 mL Syrg  Generic drug:  flu vac 2018 [id number]-[id number](PF)     furosemide 20 MG tablet  Commonly known as:  LASIX  Take 1 tablet (20 mg total) by mouth 2 (two) times daily.     levothyroxine 25 MCG tablet  Commonly known as:  SYNTHROID  Take 1 tablet (25 mcg total) by mouth once daily.     lovastatin 20 MG tablet  Commonly known as:  MEVACOR  TAKE 1 TABLET EVERY EVENING     nystatin cream  Commonly known as:  MYCOSTATIN  APPLY TO AFFECTED AREAS THREE TIMES A DAY WITH OTHER CREAM     nystatin-triamcinolone cream  Commonly known as:  MYCOLOG II     temazepam 15 mg Cap  Commonly known as:  RESTORIL  Take 1 capsule (15 mg total) by mouth nightly as needed.     VOLTAREN 1 % Gel  Generic drug:  diclofenac sodium         * This list has 6 medication(s) that are the same as other medications prescribed for you. Read the directions carefully, and ask your doctor or other care provider to review them with you.            STOP taking these medications    cilostazol 100 MG Tab  Commonly known as:  PLETAL  Stopped by:  Chucky Palacios MD PhD            Review of Systems  Constitution: Denies chills, fever, and sweats.  HENT: Denies headaches or blurry vision.  Cardiovascular: Denies chest pain or irregular heart beat.  Respiratory: Denies cough or shortness of breath.  Gastrointestinal: Denies abdominal pain, nausea, or vomiting.  Musculoskeletal: Denies muscle cramps.  Neurological: Denies dizziness or focal weakness.  Psychiatric/Behavioral: Normal mental status.  Hematologic/Lymphatic: Denies bleeding problem or easy bruising/bleeding.  Skin: Denies rash or suspicious lesions    Physical Examination  /64   Pulse 89   Temp 98.3 °F (36.8 °C)   Wt 52.6 kg (116 lb)   SpO2 (!) 94%   BMI 20.55 kg/m²     Constitutional: No acute distress, conversant  HEENT: Sclera  anicteric, Pupils equal, round and reactive to light, extraocular motions intact, Oropharynx clear  Neck: No JVD, no carotid bruits  Cardiovascular: regular rate and rhythm, no murmur, rubs or gallops, normal S1/S2  Pulmonary: Clear to auscultation bilaterally  Abdominal: Abdomen soft, nontender, nondistended, positive bowel sounds  Extremities: No lower extremity edema,   Pulses:  Carotid pulses are 2+ on the right side, and 2+ on the left side.  Radial pulses are 2+ on the right side, and 2+ on the left side.   Femoral pulses are 2+ on the right side, and 2+ on the left side.  Skin: No ecchymosis, erythema, or ulcers  Psych: Alert and oriented x 3, appropriate affect  Neuro: CNII-XII intact, no focal deficits    Labs:  Most Recent Data  CBC:   Lab Results   Component Value Date    WBC 7.18 10/17/2018    HGB 12.8 10/17/2018    HCT 39.8 10/17/2018     10/17/2018    MCV 94 10/17/2018    RDW 15.4 (H) 10/17/2018     BMP:   Lab Results   Component Value Date     10/25/2018    K 5.4 (H) 10/25/2018     10/25/2018    CO2 28 10/25/2018    BUN 34 (H) 10/25/2018    CREATININE 0.94 10/25/2018    GLU 98 10/25/2018    CALCIUM 9.6 10/25/2018     LFTS;   Lab Results   Component Value Date    PROT 7.6 10/25/2018    ALBUMIN 3.9 10/25/2018    BILITOT 0.5 10/25/2018    AST 27 10/25/2018    ALKPHOS 102 10/25/2018    ALT 23 10/25/2018     COAGS: No results found for: INR, PROTIME, PTT  FLP:   Lab Results   Component Value Date    CHOL 148 08/30/2018    HDL 46 08/30/2018    LDLCALC 82.0 08/30/2018    TRIG 100 08/30/2018    CHOLHDL 31.1 08/30/2018     CARDIAC: No results found for: TROPONINI, CKMB, BNP    EKG 10/17/2018:  Atrial fibrillation with rate of 84 bpm     Echo 10/22/2018:  CONCLUSIONS     1 - Mildly to moderately depressed left ventricular systolic function (EF 40-45%). Poor visualization of endocardial borders but EF appear mildly reduce in some views.     2 - Impaired LV relaxation, increased LVEDP.     3 -  Normal right ventricular systolic function .     4 - The estimated PA systolic pressure is 38 mmHg.     Assessment/Plan:  Viri Carpio is a 89 y.o. female with a past medical history of HLD, who presents for a follow up appointment.    1. Acute Heart Failure Exacerbation with Reduced LVEF (40-45%)- Symptoms now resolved.  Pt well compensated on exam.  Etiology unclear.  Possibilities include Afib induced heart cardiomyopathy and ischemia.  Check nuclear stress test to evaluate for ischemia.  Given reduced EF, will discontinue amlodipine.  Discontinue atenolol 100 mg daily and start metoprolol succinate 100 mg daily.  Continue olmesartan 40 mg daily.  Continue lasix 20 mg daily.  Continue daily weights.      2. New Onset Atrial Fibrillation- Pt well rate controlled and asymptomatic on atenolol 100 mg daily.  Will pursue rate control strategy at this time.  Pt is >81 hyears old and weighs 55 kg.  Continue Eliquis to 2.5 mg bid.  30 day event monitor in place.        2. PAD- Stable.  Continue ASA and statin.      Follow up in 6 weeks      Total duration of face to face visit time 30 minutes.  Total time spent counseling greater than fifty percent of total visit time.  Counseling included discussion regarding imaging findings, diagnosis, possibilities, treatment options, risks and benefits.  The patient had many questions regarding the options and long-term effects.    Chucky Palacios MD, PhD  Interventional Cardiology

## 2018-11-13 DIAGNOSIS — I48.0 PAROXYSMAL ATRIAL FIBRILLATION: Primary | ICD-10-CM

## 2018-12-11 ENCOUNTER — OFFICE VISIT (OUTPATIENT)
Dept: CARDIOLOGY | Facility: CLINIC | Age: 83
End: 2018-12-11
Payer: MEDICARE

## 2018-12-11 VITALS
HEIGHT: 63 IN | HEART RATE: 77 BPM | DIASTOLIC BLOOD PRESSURE: 70 MMHG | WEIGHT: 116 LBS | BODY MASS INDEX: 20.55 KG/M2 | SYSTOLIC BLOOD PRESSURE: 124 MMHG | OXYGEN SATURATION: 94 %

## 2018-12-11 DIAGNOSIS — I48.19 PERSISTENT ATRIAL FIBRILLATION: Primary | ICD-10-CM

## 2018-12-11 DIAGNOSIS — R09.89 DEPRESSED LEFT VENTRICULAR EJECTION FRACTION: ICD-10-CM

## 2018-12-11 DIAGNOSIS — E78.2 MIXED HYPERLIPIDEMIA: ICD-10-CM

## 2018-12-11 DIAGNOSIS — E78.5 HYPERLIPIDEMIA, UNSPECIFIED HYPERLIPIDEMIA TYPE: ICD-10-CM

## 2018-12-11 DIAGNOSIS — I10 BENIGN ESSENTIAL HTN: ICD-10-CM

## 2018-12-11 PROCEDURE — 99999 PR PBB SHADOW E&M-EST. PATIENT-LVL III: CPT | Mod: PBBFAC,HCWC,, | Performed by: INTERNAL MEDICINE

## 2018-12-11 PROCEDURE — 1101F PT FALLS ASSESS-DOCD LE1/YR: CPT | Mod: CPTII,HCWC,S$GLB, | Performed by: INTERNAL MEDICINE

## 2018-12-11 PROCEDURE — 99215 OFFICE O/P EST HI 40 MIN: CPT | Mod: HCWC,S$GLB,, | Performed by: INTERNAL MEDICINE

## 2018-12-11 RX ORDER — LOVASTATIN 20 MG/1
20 TABLET ORAL NIGHTLY
Qty: 90 TABLET | Refills: 3 | Status: SHIPPED | OUTPATIENT
Start: 2018-12-11 | End: 2019-09-24 | Stop reason: SDUPTHER

## 2018-12-11 NOTE — PATIENT INSTRUCTIONS
Assessment/Plan:  Viri Carpio is a 89 y.o. female with a past medical history of HLD, who presents for a follow up appointment.    1. Acute Heart Failure Exacerbation with Reduced LVEF (40-45%)- Symptoms now resolved.  Pt well compensated on exam.  Etiology unclear.  Possibilities include Afib induced cardiomyopathy, and ischemia.  Pt and her daughter decided not have stress test done.  Continue metoprolol succinate 100 mg daily, and olmesartan 40 mg daily.  Continue lasix 20 mg daily.  Continue daily weights.      2. New Onset Atrial Fibrillation- Awaiting 30 day event monitor results.  Pt appears to be in Afib today by auscultation.  Will continue to pursue rate control strategy at this time. Given reduced EF,continue metoprolol succinate 100 mg daily.  Continue Eliquis to 2.5 mg bid.          2. PAD- Stable.  Continue ASA and statin.      Follow up in 6 months

## 2018-12-11 NOTE — PROGRESS NOTES
[facility]    Chief Complaint   Patient presents with    Follow-up     30 day event monitor       Patient ID: Viri Carpio is a 89 y.o. female with a past medical history of HLD, who presents for a follow-up appointment.  Pertinent history/events are as follows:     -Pt kindly referred by Dr. Casiano for evaluation of new onset Afib, SOB, LE edema.    -At our initial clinic visit on 10/17/2018, Mrs. Carpio is accompanied by her daughter.  She reported onset of ATKINSON beginning approximately 2 weeks ago.  Around the same time, she noticed swelling in both legs/ankles.  She has no chest pain.  EKG done today by PCP shows new onset atrial fibrillation with rate of 83 bpm.    Plan: Pt well rate controlled and asymptomatic on atenolol 100 mg daily.  Will pursue rate control strategy at this time.  Pt is >81 hyears old and weighs 55 kg.  Decrease Eliquis to 2.5 mg bid.  Place 30 day event monitor.  Check echo.  CXR, cmp, proBNP level today.  Continue ASA and statin for PAD, which is stable.     -Studies from 10/17/2018 revealed: proBNP elevated at 2210.  CXR with bilateral perihilar and basilar opacities with interstitial prominence concerning for edema.  There is blunting of the costophrenic angles bilaterally suspicious for pleural fluid.    -At follow up clinic visit on 10/26/2018,  reported feeling much better since our initial visit on 10/17/2018.  Weight has decreased from 123 pounds to 110.  LE edema and SOB resolved.  Repeat CXR from 10/24/2018 shows significant improvement of pulmonary edema.  Cr stable.  Now taking lasix 20 mg daily instead of bid. 30 day event monitor in place.  Echo from 10/22/2018 shows mildly to moderately depressed left ventricular systolic function (EF 40-45%). (poor visualization of endocardial borders but EF appear mildly reduce in some views).  impaired LV relaxation, increased LVEDP; normal right ventricular systolic function; estimated PA systolic  pressure is 38 mmHg.  Plan: Symptoms now resolved.  Pt well compensated on exam.  Etiology unclear.  Possibilities include Afib induced cardiomyopathy, and ischemia.  Check nuclear stress test to evaluate for ischemia.  Given reduced EF, will discontinue atenolol 100 mg daily and start metoprolol succinate 100 mg daily. Pt is >81 years old and weighs 55 kg.  Continue Eliquis to 2.5 mg bid.  30 day event monitor in place.          HPI:  Mrs. Carpio is accompanied by her daughter.  Reports no chest pain, SOB, or significant LE edema.  Awaiting 30 day event monitor results.  Pt and her daughter decided not have stress test done.   Blood pressure today  Is 124/70 with pulse of 77 bpm.      Past Medical History:   Diagnosis Date    Depression     Diabetes mellitus, type 2     Hyperlipidemia     Insomnia     Peripheral artery disease      Past Surgical History:   Procedure Laterality Date    APPENDECTOMY       Social History     Socioeconomic History    Marital status:      Spouse name: Not on file    Number of children: Not on file    Years of education: Not on file    Highest education level: Not on file   Social Needs    Financial resource strain: Not on file    Food insecurity - worry: Not on file    Food insecurity - inability: Not on file    Transportation needs - medical: Not on file    Transportation needs - non-medical: Not on file   Occupational History    Not on file   Tobacco Use    Smoking status: Former Smoker    Smokeless tobacco: Former User   Substance and Sexual Activity    Alcohol use: No     Alcohol/week: 0.0 oz    Drug use: No    Sexual activity: Not on file   Other Topics Concern    Not on file   Social History Narrative    Not on file     No family history on file.    Review of patient's allergies indicates:  No Known Allergies       Medication List           Accurate as of 12/11/18  9:41 AM. If you have any questions, ask your nurse or doctor.               CHANGE  "how you take these medications    furosemide 20 MG tablet  Commonly known as:  LASIX  Take 1 tablet (20 mg total) by mouth 2 (two) times daily.  What changed:  when to take this        CONTINUE taking these medications    apixaban 2.5 mg Tab  Commonly known as:  ELIQUIS  Take 1 tablet (2.5 mg total) by mouth 2 (two) times daily.     aspirin 81 MG EC tablet  Commonly known as:  ECOTRIN     escitalopram oxalate 10 MG tablet  Commonly known as:  LEXAPRO  TAKE 1 TABLET EVERY DAY     FLUAD 3793-2772 (65 YR UP)(PF) 45 mcg (15 mcg x 3)/0.5 mL Syrg  Generic drug:  flu vac 2018 [id number]-[id number](PF)     levothyroxine 25 MCG tablet  Commonly known as:  SYNTHROID  Take 1 tablet (25 mcg total) by mouth once daily.     lovastatin 20 MG tablet  Commonly known as:  MEVACOR  TAKE 1 TABLET EVERY EVENING     metoprolol succinate 100 MG 24 hr tablet  Commonly known as:  TOPROL-XL  Take 1 tablet (100 mg total) by mouth once daily.     nystatin-triamcinolone cream  Commonly known as:  MYCOLOG II     olmesartan 40 MG tablet  Commonly known as:  BENICAR  Take 1 tablet (40 mg total) by mouth once daily.     temazepam 15 mg Cap  Commonly known as:  RESTORIL  Take 1 capsule (15 mg total) by mouth nightly as needed.     VOLTAREN 1 % Gel  Generic drug:  diclofenac sodium            Review of Systems  Constitution: Denies chills, fever, and sweats.  HENT: Denies headaches or blurry vision.  Cardiovascular: Denies chest pain or irregular heart beat.  Respiratory: Denies cough or shortness of breath.  Gastrointestinal: Denies abdominal pain, nausea, or vomiting.  Musculoskeletal: Denies muscle cramps.  Neurological: Denies dizziness or focal weakness.  Psychiatric/Behavioral: Normal mental status.  Hematologic/Lymphatic: Denies bleeding problem or easy bruising/bleeding.  Skin: Denies rash or suspicious lesions    Physical Examination  /70 (BP Location: Left arm, Patient Position: Sitting, BP Method: Medium (Manual))   Pulse 77   Ht 5' 3" " (1.6 m)   Wt 52.6 kg (116 lb)   SpO2 (!) 94%   BMI 20.55 kg/m²     Constitutional: No acute distress, conversant  HEENT: Sclera anicteric, Pupils equal, round and reactive to light, extraocular motions intact, Oropharynx clear  Neck: No JVD, no carotid bruits  Cardiovascular: Irregularly irregular, no rubs or gallops  Pulmonary: Clear to auscultation bilaterally  Abdominal: Abdomen soft, nontender, nondistended, positive bowel sounds  Extremities: No lower extremity edema,   Pulses:  Carotid pulses are 2+ on the right side, and 2+ on the left side.  Radial pulses are 2+ on the right side, and 2+ on the left side.   Femoral pulses are 2+ on the right side, and 2+ on the left side.  Skin: No ecchymosis, erythema, or ulcers  Psych: Alert and oriented x 3, appropriate affect  Neuro: CNII-XII intact, no focal deficits    Labs:  Most Recent Data  CBC:   Lab Results   Component Value Date    WBC 7.18 10/17/2018    HGB 12.8 10/17/2018    HCT 39.8 10/17/2018     10/17/2018    MCV 94 10/17/2018    RDW 15.4 (H) 10/17/2018     BMP:   Lab Results   Component Value Date     10/25/2018    K 5.4 (H) 10/25/2018     10/25/2018    CO2 28 10/25/2018    BUN 34 (H) 10/25/2018    CREATININE 0.94 10/25/2018    GLU 98 10/25/2018    CALCIUM 9.6 10/25/2018     LFTS;   Lab Results   Component Value Date    PROT 7.6 10/25/2018    ALBUMIN 3.9 10/25/2018    BILITOT 0.5 10/25/2018    AST 27 10/25/2018    ALKPHOS 102 10/25/2018    ALT 23 10/25/2018     COAGS: No results found for: INR, PROTIME, PTT  FLP:   Lab Results   Component Value Date    CHOL 148 08/30/2018    HDL 46 08/30/2018    LDLCALC 82.0 08/30/2018    TRIG 100 08/30/2018    CHOLHDL 31.1 08/30/2018     CARDIAC: No results found for: TROPONINI, CKMB, BNP    EKG 10/17/2018:  Atrial fibrillation with rate of 84 bpm     Echo 10/22/2018:  CONCLUSIONS     1 - Mildly to moderately depressed left ventricular systolic function (EF 40-45%). Poor visualization of endocardial  borders but EF appear mildly reduce in some views.     2 - Impaired LV relaxation, increased LVEDP.     3 - Normal right ventricular systolic function .     4 - The estimated PA systolic pressure is 38 mmHg.     Assessment/Plan:  Viri Carpio is a 89 y.o. female with a past medical history of HLD, who presents for a follow up appointment.    1. Acute Heart Failure Exacerbation with Reduced LVEF (40-45%)- Symptoms now resolved.  Pt well compensated on exam.  Etiology unclear.  Possibilities include Afib induced cardiomyopathy, and ischemia.  Pt and her daughter decided not have stress test done.  Continue metoprolol succinate 100 mg daily, and olmesartan 40 mg daily.  Continue lasix 20 mg daily.  Continue daily weights.      2. New Onset Atrial Fibrillation- Awaiting 30 day event monitor results.  Pt appears to be in Afib today by auscultation.  Will continue to pursue rate control strategy at this time. Given reduced EF,continue metoprolol succinate 100 mg daily.  Continue Eliquis to 2.5 mg bid.          2. PAD- Stable.  Continue ASA and statin.      Follow up in 6 months      Total duration of face to face visit time 30 minutes.  Total time spent counseling greater than fifty percent of total visit time.  Counseling included discussion regarding imaging findings, diagnosis, possibilities, treatment options, risks and benefits.  The patient had many questions regarding the options and long-term effects.    Chucky Palacios MD, PhD  Interventional Cardiology

## 2018-12-18 ENCOUNTER — TELEPHONE (OUTPATIENT)
Dept: FAMILY MEDICINE | Facility: CLINIC | Age: 83
End: 2018-12-18

## 2018-12-19 ENCOUNTER — OFFICE VISIT (OUTPATIENT)
Dept: FAMILY MEDICINE | Facility: CLINIC | Age: 83
End: 2018-12-19
Payer: MEDICARE

## 2018-12-19 VITALS
WEIGHT: 108.88 LBS | OXYGEN SATURATION: 88 % | SYSTOLIC BLOOD PRESSURE: 108 MMHG | DIASTOLIC BLOOD PRESSURE: 66 MMHG | TEMPERATURE: 99 F | HEART RATE: 106 BPM | BODY MASS INDEX: 19.29 KG/M2 | HEIGHT: 63 IN

## 2018-12-19 DIAGNOSIS — J40 BRONCHITIS: ICD-10-CM

## 2018-12-19 DIAGNOSIS — J06.9 URI, ACUTE: Primary | ICD-10-CM

## 2018-12-19 PROCEDURE — 99214 OFFICE O/P EST MOD 30 MIN: CPT | Mod: 25,S$GLB,, | Performed by: FAMILY MEDICINE

## 2018-12-19 PROCEDURE — 1101F PT FALLS ASSESS-DOCD LE1/YR: CPT | Mod: CPTII,S$GLB,, | Performed by: FAMILY MEDICINE

## 2018-12-19 PROCEDURE — 96372 THER/PROPH/DIAG INJ SC/IM: CPT | Mod: S$GLB,,, | Performed by: FAMILY MEDICINE

## 2018-12-19 PROCEDURE — 99999 PR PBB SHADOW E&M-EST. PATIENT-LVL III: CPT | Mod: PBBFAC,,, | Performed by: FAMILY MEDICINE

## 2018-12-19 RX ORDER — AZITHROMYCIN 250 MG/1
TABLET, FILM COATED ORAL
Qty: 6 TABLET | Refills: 0 | Status: ON HOLD | OUTPATIENT
Start: 2018-12-19 | End: 2019-02-19 | Stop reason: HOSPADM

## 2018-12-19 RX ORDER — BETAMETHASONE SODIUM PHOSPHATE AND BETAMETHASONE ACETATE 3; 3 MG/ML; MG/ML
6 INJECTION, SUSPENSION INTRA-ARTICULAR; INTRALESIONAL; INTRAMUSCULAR; SOFT TISSUE
Status: COMPLETED | OUTPATIENT
Start: 2018-12-19 | End: 2018-12-19

## 2018-12-19 RX ADMIN — BETAMETHASONE SODIUM PHOSPHATE AND BETAMETHASONE ACETATE 6 MG: 3; 3 INJECTION, SUSPENSION INTRA-ARTICULAR; INTRALESIONAL; INTRAMUSCULAR; SOFT TISSUE at 10:12

## 2018-12-19 NOTE — PATIENT INSTRUCTIONS
What Is Acute Bronchitis?  Acute bronchitis is when the airways in your lungs (bronchial tubes) become red and swollen (inflamed). It is usually caused by a viral infection. But it can also occur because of a bacteria or allergen. Symptoms include a cough that produces yellow or greenish mucus and can last for days or sometimes weeks.  Inside healthy lungs    Air travels in and out of the lungs through the airways. The linings of these airways produce sticky mucus. This mucus traps particles that enter the lungs. Tiny structures called cilia then sweep the particles out of the airways.     Healthy airway: Airways are normally open. Air moves in and out easily.      Healthy cilia: Tiny, hairlike cilia sweep mucus and particles up and out of the airways.   Lungs with bronchitis  Bronchitis often occurs with a cold or the flu virus. The airways become inflamed (red and swollen). There is a deep hacking cough from the extra mucus. Other symptoms may include:  · Wheezing  · Chest discomfort  · Shortness of breath  · Mild fever  A second infection, this time due to bacteria, may then occur. And airways irritated by allergens or smoke are more likely to get infected.        Inflamed airway: Inflammation and extra mucus narrow the airway, causing shortness of breath.      Impaired cilia: Extra mucus impairs cilia, causing congestion and wheezing. Smoking makes the problem worse.   Making a diagnosis  A physical exam, health history, and certain tests help your healthcare provider make the diagnosis.  Health history  Your healthcare provider will ask you about your symptoms.  The exam  Your provider listens to your chest for signs of congestion. He or she may also check your ears, nose, and throat.  Possible tests  · A sputum test for bacteria. This requires a sample of mucus from your lungs.  · A nasal or throat swab. This tests to see if you have a bacterial infection.  · A chest X-ray. This is done if your healthcare  provider thinks you have pneumonia.  · Tests to check for an underlying condition. Other tests may be done to check for things such as allergies, asthma, or COPD (chronic obstructive pulmonary disease). You may need to see a specialist for more lung function testing.  Treating a cough  The main treatment for bronchitis is easing symptoms. Avoiding smoke, allergens, and other things that trigger coughing can often help. If the infection is bacterial, you may be given antibiotics. During the illness, it's important to get plenty of sleep. To ease symptoms:  · Dont smoke. Also avoid secondhand smoke.  · Use a humidifier. Or try breathing in steam from a hot shower. This may help loosen mucus.  · Drink a lot of water and juice. They can soothe the throat and may help thin mucus.  · Sit up or use extra pillows when in bed. This helps to lessen coughing and congestion.  · Ask your provider about using medicine. Ask about using cough medicine, pain and fever medicine, or a decongestant.  Antibiotics  Most cases of bronchitis are caused by cold or flu viruses. They dont need antibiotics to treat them, even if your mucus is thick and green or yellow. Antibiotics dont treat viral illness and antibiotics have not been shown to have any benefit in cases of acute bronchitis. Taking antibiotics when they are not needed increases your risk of getting an infection later that is antibiotic-resistant. Antibiotics can also cause severe cases of diarrhea that require other antibiotics to treat.  It is important that you accept your healthcare provider's opinion to not use antibiotics. Your provider will prescribe antibiotics if the infection is caused by bacteria. If they are prescribed:  · Take all of the medicine. Take the medicine until it is used up, even if symptoms have improved. If you dont, the bronchitis may come back.  · Take the medicines as directed. For instance, some medicines should be taken with food.  · Ask about  side effects. Ask your provider or pharmacist what side effects are common, and what to do about them.  Follow-up care  You should see your provider again in 2 to 3 weeks. By this time, symptoms should have improved. An infection that lasts longer may mean you have a more serious problem.  Prevention  · Avoid tobacco smoke. If you smoke, quit. Stay away from smoky places. Ask friends and family not to smoke around you, or in your home or car.  · Get checked for allergies.  · Ask your provider about getting a yearly flu shot. Also ask about pneumococcal or pneumonia shots.  · Wash your hands often. This helps reduce the chance of picking up viruses that cause colds and flu.  Call your healthcare provider if:  · Symptoms worsen, or you have new symptoms  · Breathing problems worsen or  become severe  · Symptoms dont get better within a week, or within 3 days of taking antibiotics   Date Last Reviewed: 2/1/2017  © 4057-6408 [employer], [employer] [address] [address], [place] [address]. All rights reserved. This information is not intended as a substitute for professional medical care. Always follow your healthcare professional's instructions.

## 2018-12-19 NOTE — PROGRESS NOTES
Subjective:       Patient ID: Viri Carpio is a 89 y.o. female.    Chief Complaint: Cough (x 1 week and weakness)    Daughter talking for MOM, c/o persistent cough and chest congestion off and on x 3-4 weeks, had negative cxr, no ABX prescribed, taking only Delsym  Mostly non productive phlegm       Review of Systems    Objective:      Physical Exam   Constitutional: She is oriented to person, place, and time. She appears well-developed and well-nourished. She is cooperative.  Non-toxic appearance. She does not appear ill. No distress.   HENT:   Head: Normocephalic and atraumatic.   Right Ear: Hearing, tympanic membrane, external ear and ear canal normal.   Left Ear: Hearing, tympanic membrane, external ear and ear canal normal.   Nose: Nose normal. No mucosal edema, rhinorrhea or nasal deformity. No epistaxis. Right sinus exhibits no maxillary sinus tenderness and no frontal sinus tenderness. Left sinus exhibits no maxillary sinus tenderness and no frontal sinus tenderness.   Mouth/Throat: Uvula is midline, oropharynx is clear and moist and mucous membranes are normal. No trismus in the jaw. Normal dentition. No uvula swelling. No oropharyngeal exudate or posterior oropharyngeal erythema.   Eyes: Conjunctivae and lids are normal. Right eye exhibits no discharge. Left eye exhibits no discharge. No scleral icterus.   Sclera clear bilat   Neck: Normal range of motion, full passive range of motion without pain and phonation normal. Neck supple. No JVD present. No tracheal deviation present.   Cardiovascular: Normal rate, regular rhythm, normal heart sounds, intact distal pulses and normal pulses. Exam reveals no gallop and no friction rub.   No murmur heard.  Pulmonary/Chest: Effort normal and breath sounds normal. No stridor. No respiratory distress. She has no rales.   Abdominal: Soft. Normal appearance and bowel sounds are normal. She exhibits no distension, no pulsatile midline mass and no mass. There is no  tenderness.   Genitourinary: No vaginal discharge found.   Musculoskeletal: Normal range of motion. She exhibits no edema or tenderness.   Lymphadenopathy:     She has no cervical adenopathy.   Neurological: She is alert and oriented to person, place, and time. She exhibits normal muscle tone. Coordination normal.   Skin: Skin is warm, dry and intact. She is not diaphoretic. No pallor.   Psychiatric: She has a normal mood and affect. Her speech is normal and behavior is normal. Judgment and thought content normal. Cognition and memory are normal.   Nursing note and vitals reviewed.      Assessment:       No diagnosis found.    Plan:     Viri was seen today for cough.    Diagnoses and all orders for this visit:    URI, acute    Bronchitis    Other orders  -     azithromycin (ZITHROMAX Z-GUANACO) 250 MG tablet; Take 2 tablets (500 mg) on  Day 1,  followed by 1 tablet (250 mg) once daily on Days 2 through 5.  -     betamethasone acetate-betamethasone sodium phosphate injection 6 mg        mucinex one bid

## 2018-12-19 NOTE — TELEPHONE ENCOUNTER
----- Message from Natalia Velasco sent at 12/18/2018  4:38 PM CST -----  Contact: [phone]/daughter/Favio  Patient's daughter is requesting a call back. Patient has a bad cold and is very weak. Would like to be seen as soon as possible. Please advise.

## 2019-01-21 ENCOUNTER — TELEPHONE (OUTPATIENT)
Dept: CARDIOLOGY | Facility: CLINIC | Age: 84
End: 2019-01-21

## 2019-01-21 NOTE — TELEPHONE ENCOUNTER
Pt encouraged to use acetaminophen instead of mobic.  ----- Message from Karlee Olmos MA sent at 1/21/2019  2:49 PM CST -----  Contact: [phone]/Priya (pt daughter)  Ms Ayala has been suffering from arthritis and her daughter has given her Mobic a few times that seemed to help elevate her pain.. Daughter is wondering if its safe for her to continue taking, if so she can have her pcp prescribe.  ----- Message -----  From: Zarina Fuentes  Sent: 1/21/2019  12:01 PM  To: Philip TAFOYA Staff    Pt would like a callback concerning changing medication for arthritis. Please call and advise.

## 2019-01-21 NOTE — TELEPHONE ENCOUNTER
Pt encouraged to use acetaminophen instead of mobic.    ----- Message from Karlee Olmos MA sent at 1/21/2019  2:26 PM CST -----  Contact: [phone]/Priya (pt daughter)  Ms Ayala is suffering from arthirtis and recently took Mobic that her daughter has given her and seems to help. Do you think Mobic is safe to take considering her heart condition?  ----- Message -----  From: Zarina Fuentes  Sent: 1/21/2019  12:01 PM  To: Philip TAFOYA Staff    Pt would like a callback concerning changing medication for arthritis. Please call and advise.

## 2019-01-21 NOTE — TELEPHONE ENCOUNTER
----- Message from Zarina Fuentes sent at 1/21/2019 12:01 PM CST -----  Contact: [phone]/Priya (pt daughter)  Pt would like a callback concerning changing medication for arthritis. Please call and advise.

## 2019-02-01 ENCOUNTER — TELEPHONE (OUTPATIENT)
Dept: ADMINISTRATIVE | Facility: HOSPITAL | Age: 84
End: 2019-02-01

## 2019-02-17 PROBLEM — R53.83 FATIGUE: Status: ACTIVE | Noted: 2019-02-17

## 2019-02-18 PROBLEM — I50.43 ACUTE ON CHRONIC COMBINED SYSTOLIC AND DIASTOLIC CHF (CONGESTIVE HEART FAILURE): Status: ACTIVE | Noted: 2019-02-18

## 2019-02-18 PROBLEM — I50.9 CONGESTIVE HEART FAILURE: Status: ACTIVE | Noted: 2019-02-18

## 2019-02-18 PROBLEM — J96.01 ACUTE RESPIRATORY FAILURE WITH HYPOXIA: Status: ACTIVE | Noted: 2019-02-18

## 2019-02-18 PROBLEM — F41.9 ANXIETY: Status: ACTIVE | Noted: 2019-02-18

## 2019-02-19 ENCOUNTER — TELEPHONE (OUTPATIENT)
Dept: CARDIOLOGY | Facility: CLINIC | Age: 84
End: 2019-02-19

## 2019-02-19 PROBLEM — R53.83 FATIGUE: Status: RESOLVED | Noted: 2019-02-17 | Resolved: 2019-02-19

## 2019-02-19 PROBLEM — J96.01 ACUTE RESPIRATORY FAILURE WITH HYPOXIA: Status: RESOLVED | Noted: 2019-02-18 | Resolved: 2019-02-19

## 2019-02-19 PROBLEM — I50.43 ACUTE ON CHRONIC COMBINED SYSTOLIC AND DIASTOLIC CHF (CONGESTIVE HEART FAILURE): Status: RESOLVED | Noted: 2019-02-18 | Resolved: 2019-02-19

## 2019-02-19 NOTE — TELEPHONE ENCOUNTER
----- Message from Karina López sent at 2/19/2019 12:31 PM CST -----  Contact: Favio - [phone]  Patient is requesting a call back regarding, her daughter called she will not be able to come in tomorrow because of the weather can you fit her in later this week? Please advise

## 2019-02-26 ENCOUNTER — OFFICE VISIT (OUTPATIENT)
Dept: CARDIOLOGY | Facility: CLINIC | Age: 84
End: 2019-02-26
Payer: MEDICARE

## 2019-02-26 VITALS
OXYGEN SATURATION: 96 % | DIASTOLIC BLOOD PRESSURE: 63 MMHG | HEIGHT: 62 IN | WEIGHT: 110 LBS | BODY MASS INDEX: 20.24 KG/M2 | SYSTOLIC BLOOD PRESSURE: 105 MMHG | HEART RATE: 71 BPM

## 2019-02-26 DIAGNOSIS — I10 BENIGN ESSENTIAL HTN: ICD-10-CM

## 2019-02-26 DIAGNOSIS — R06.02 SOB (SHORTNESS OF BREATH) ON EXERTION: ICD-10-CM

## 2019-02-26 DIAGNOSIS — I48.20 CHRONIC ATRIAL FIBRILLATION: ICD-10-CM

## 2019-02-26 DIAGNOSIS — E78.2 MIXED HYPERLIPIDEMIA: ICD-10-CM

## 2019-02-26 DIAGNOSIS — I73.9 PERIPHERAL ARTERY DISEASE: ICD-10-CM

## 2019-02-26 DIAGNOSIS — F41.9 ANXIETY: Primary | ICD-10-CM

## 2019-02-26 DIAGNOSIS — I50.22 CHRONIC SYSTOLIC CONGESTIVE HEART FAILURE: ICD-10-CM

## 2019-02-26 PROCEDURE — 99999 PR PBB SHADOW E&M-EST. PATIENT-LVL III: ICD-10-PCS | Mod: PBBFAC,HCNC,, | Performed by: INTERNAL MEDICINE

## 2019-02-26 PROCEDURE — 3288F PR FALLS RISK ASSESSMENT DOCUMENTED: ICD-10-PCS | Mod: HCNC,CPTII,S$GLB, | Performed by: INTERNAL MEDICINE

## 2019-02-26 PROCEDURE — 1100F PTFALLS ASSESS-DOCD GE2>/YR: CPT | Mod: HCNC,CPTII,S$GLB, | Performed by: INTERNAL MEDICINE

## 2019-02-26 PROCEDURE — 99999 PR PBB SHADOW E&M-EST. PATIENT-LVL III: CPT | Mod: PBBFAC,HCNC,, | Performed by: INTERNAL MEDICINE

## 2019-02-26 PROCEDURE — 1100F PR PT FALLS ASSESS DOC 2+ FALLS/FALL W/INJURY/YR: ICD-10-PCS | Mod: HCNC,CPTII,S$GLB, | Performed by: INTERNAL MEDICINE

## 2019-02-26 PROCEDURE — 99215 PR OFFICE/OUTPT VISIT, EST, LEVL V, 40-54 MIN: ICD-10-PCS | Mod: HCNC,S$GLB,, | Performed by: INTERNAL MEDICINE

## 2019-02-26 PROCEDURE — 3288F FALL RISK ASSESSMENT DOCD: CPT | Mod: HCNC,CPTII,S$GLB, | Performed by: INTERNAL MEDICINE

## 2019-02-26 PROCEDURE — 99215 OFFICE O/P EST HI 40 MIN: CPT | Mod: HCNC,S$GLB,, | Performed by: INTERNAL MEDICINE

## 2019-02-26 NOTE — PATIENT INSTRUCTIONS
Assessment/Plan:  Viri Carpio is a 89 y.o. female with a past medical history of HLD, who presents for a follow up appointment.    1. Acute Heart Failure Exacerbation with Reduced LVEF (40-45%)- Pt with recent hospital admission.  Symptoms now resolved.  Pt well compensated on exam.  Continue metoprolol succinate 100 mg daily, and olmesartan 40 mg daily.  Continue lasix 20 mg daily.  Continue daily weights.      2. New Onset Atrial Fibrillation-  Thirty day event monitor from 11/24/2018 shows rate controlled Afib.  Will continue to pursue rate control strategy at this time. Given reduced EF,continue metoprolol succinate 100 mg daily.  Continue Eliquis to 2.5 mg bid.          2. PAD- Stable.  Continue ASA and statin.      Follow up in 4 months

## 2019-02-26 NOTE — PROGRESS NOTES
[facility]    Chief Complaint   Patient presents with    Hospital Follow Up     CHF       Patient ID: Viri Carpio is a 89 y.o. female with a past medical history of HLD, who presents for a follow-up appointment.  Pertinent history/events are as follows:     -Pt kindly referred by Dr. Casiano for evaluation of new onset Afib, SOB, LE edema.    -At our initial clinic visit on 10/17/2018, Mrs. Carpio is accompanied by her daughter.  She reported onset of ATKINSON beginning approximately 2 weeks ago.  Around the same time, she noticed swelling in both legs/ankles.  She has no chest pain.  EKG done today by PCP shows new onset atrial fibrillation with rate of 83 bpm.    Plan: Pt well rate controlled and asymptomatic on atenolol 100 mg daily.  Will pursue rate control strategy at this time.  Pt is >81 hyears old and weighs 55 kg.  Decrease Eliquis to 2.5 mg bid.  Place 30 day event monitor.  Check echo.  CXR, cmp, proBNP level today.  Continue ASA and statin for PAD, which is stable.     -Studies from 10/17/2018 revealed: proBNP elevated at 2210.  CXR with bilateral perihilar and basilar opacities with interstitial prominence concerning for edema.  There is blunting of the costophrenic angles bilaterally suspicious for pleural fluid.    -At follow up clinic visit on 10/26/2018,  reported feeling much better since our initial visit on 10/17/2018.  Weight has decreased from 123 pounds to 110.  LE edema and SOB resolved.  Repeat CXR from 10/24/2018 shows significant improvement of pulmonary edema.  Cr stable.  Now taking lasix 20 mg daily instead of bid. 30 day event monitor in place.  Echo from 10/22/2018 shows mildly to moderately depressed left ventricular systolic function (EF 40-45%). (poor visualization of endocardial borders but EF appear mildly reduce in some views).  impaired LV relaxation, increased LVEDP; normal right ventricular systolic function; estimated PA systolic pressure is 38  mmHg.  Plan: Symptoms now resolved.  Pt well compensated on exam.  Etiology unclear.  Possibilities include Afib induced cardiomyopathy, and ischemia.  Check nuclear stress test to evaluate for ischemia.  Given reduced EF, will discontinue atenolol 100 mg daily and start metoprolol succinate 100 mg daily. Pt is >81 years old and weighs 55 kg.  Continue Eliquis to 2.5 mg bid.  30 day event monitor in place.          -At clinic visit on 12/11/2018, Mrs. Carpio is accompanied by her daughter.  Reported no chest pain, SOB, or significant LE edema.  Awaiting 30 day event monitor results.  Pt and her daughter decided not have stress test done.   Blood pressure today  Is 124/70 with pulse of 77 bpm.  Plan: Continue metoprolol succinate 100 mg daily, and olmesartan 40 mg daily.  Continue lasix 20 mg daily.  Continue daily weights.    Regarding new onset Afib, awaiting 30 day event monitor results.  Pt appears to be in Afib today by auscultation.  Will continue to pursue rate control strategy at this time. Given reduced EF,continue metoprolol succinate 100 mg daily.  Continue Eliquis to 2.5 mg bid.    -On 2/17/2019, pt admitted with weakness (details per hospital course below):    Patient found to be saturating 88% on room air, no history of O2 at home.  Chest x-ray consistent with vascular congestion and pulmonary edema of CHF.  Patient had echocardiogram done in August 2018 which showed a left ventricular ejection fraction 40-45% and diastolic dysfunction.  Patient started on IV Lasix with good urinary output.  Admitted with acute CHF exacerbation for further evaluation treatment.     Hospital Course:   Patient admitted and started on IV Lasix with good urinary output.  Patient's breathing improved decreasing requirements of supplemental O2 down to 2 L but still desaturating when off of O2.  Lasix continued and patient continued to diurese well.  On 2nd day of admission, patient off supplemental O2 at rest.  6 min walk  test completed with respiratory therapy which showed only desaturation to 92%.  Patient clinically improved and medically stable for discharge home with instructions to follow-up with PCP and Cardiology in 1-2 weeks.  Will increase Lasix dose for 3 more days and then return to normal Lasix dose.  Counseled patient on low-salt diet.    HPI:  Mrs. Carpio reports feeling much better since hospital discharge.  She has no chest pain, SOB, or LE edema.  Now taking lasix 20 mg daily.  Thirty day event monitor from 11/24/2018 shows rate controlled Afib.      Past Medical History:   Diagnosis Date    Atrial fibrillation     Depression     Diabetes mellitus, type 2     Hyperlipidemia     Insomnia     Peripheral artery disease      Past Surgical History:   Procedure Laterality Date    APPENDECTOMY       Social History     Socioeconomic History    Marital status:      Spouse name: Not on file    Number of children: Not on file    Years of education: Not on file    Highest education level: Not on file   Social Needs    Financial resource strain: Not on file    Food insecurity - worry: Not on file    Food insecurity - inability: Not on file    Transportation needs - medical: Not on file    Transportation needs - non-medical: Not on file   Occupational History    Not on file   Tobacco Use    Smoking status: Former Smoker    Smokeless tobacco: Former User   Substance and Sexual Activity    Alcohol use: No     Alcohol/week: 0.0 oz    Drug use: No    Sexual activity: Not on file   Other Topics Concern    Not on file   Social History Narrative    Not on file     No family history on file.    Review of patient's allergies indicates:  No Known Allergies    Medication List with Changes/Refills   Current Medications    APIXABAN (ELIQUIS) 2.5 MG TAB    Take 1 tablet (2.5 mg total) by mouth 2 (two) times daily.    ASPIRIN (ECOTRIN) 81 MG EC TABLET    Take 81 mg by mouth once daily.     "COMPR.STOCKING,KNEE,LONG,LARGE MISC        ESCITALOPRAM OXALATE (LEXAPRO) 10 MG TABLET    TAKE 1 TABLET EVERY DAY    FLUAD [id number], 65 YR UP,,PF, 45 MCG (15 MCG X 3)/0.5 ML SYRG        FUROSEMIDE (LASIX) 20 MG TABLET    Take 1 tablet (20 mg total) by mouth 2 (two) times daily for 3 days, THEN 1 tablet (20 mg total) once daily.    LEVOTHYROXINE (SYNTHROID) 25 MCG TABLET    Take 1 tablet (25 mcg total) by mouth once daily.    LOVASTATIN (MEVACOR) 20 MG TABLET    Take 1 tablet (20 mg total) by mouth every evening.    METOPROLOL SUCCINATE (TOPROL-XL) 100 MG 24 HR TABLET    Take 1 tablet (100 mg total) by mouth once daily.    NYSTATIN-TRIAMCINOLONE (MYCOLOG II) CREAM        OLMESARTAN (BENICAR) 40 MG TABLET    Take 1 tablet (40 mg total) by mouth once daily.    TEMAZEPAM (RESTORIL) 15 MG CAP    Take 1 capsule (15 mg total) by mouth nightly as needed.    VOLTAREN 1 % GEL    Apply 2 gram(s) to affected area 3 times a day as needed PRN MANAGEMENT       Review of Systems  Constitution: Denies chills, fever, and sweats.  HENT: Denies headaches or blurry vision.  Cardiovascular: Denies chest pain or irregular heart beat.  Respiratory: Denies cough or shortness of breath.  Gastrointestinal: Denies abdominal pain, nausea, or vomiting.  Musculoskeletal: Denies muscle cramps.  Neurological: Denies dizziness or focal weakness.  Psychiatric/Behavioral: Normal mental status.  Hematologic/Lymphatic: Denies bleeding problem or easy bruising/bleeding.  Skin: Denies rash or suspicious lesions    Physical Examination  /63 (BP Location: Left arm, Patient Position: Sitting, BP Method: Medium (Automatic))   Pulse 71   Ht 5' 2" (1.575 m)   Wt 49.9 kg (110 lb)   SpO2 96%   BMI 20.12 kg/m²     Constitutional: No acute distress, conversant  HEENT: Sclera anicteric, Pupils equal, round and reactive to light, extraocular motions intact, Oropharynx clear  Neck: No JVD, no carotid bruits  Cardiovascular: Irregularly irregular, no rubs " or gallops  Pulmonary: Clear to auscultation bilaterally  Abdominal: Abdomen soft, nontender, nondistended, positive bowel sounds  Extremities: No lower extremity edema,   Pulses:  Carotid pulses are 2+ on the right side, and 2+ on the left side.  Radial pulses are 2+ on the right side, and 2+ on the left side.   Femoral pulses are 2+ on the right side, and 2+ on the left side.  Skin: No ecchymosis, erythema, or ulcers  Psych: Alert and oriented x 3, appropriate affect  Neuro: CNII-XII intact, no focal deficits    Labs:  Most Recent Data  CBC:   Lab Results   Component Value Date    WBC 4.86 02/19/2019    HGB 11.6 (L) 02/19/2019    HCT 36.0 (L) 02/19/2019     02/19/2019    MCV 93 02/19/2019    RDW 15.3 (H) 02/19/2019     BMP:   Lab Results   Component Value Date     02/19/2019    K 3.3 (L) 02/19/2019     02/19/2019    CO2 29 02/19/2019    BUN 15 02/19/2019    CREATININE 0.91 02/19/2019    GLU 89 02/19/2019    CALCIUM 8.6 (L) 02/19/2019     LFTS;   Lab Results   Component Value Date    PROT 6.3 02/19/2019    ALBUMIN 2.9 (L) 02/19/2019    BILITOT 0.8 02/19/2019    AST 15 02/19/2019    ALKPHOS 78 02/19/2019    ALT 7 (L) 02/19/2019     COAGS:   Lab Results   Component Value Date    INR 1.6 (H) 02/17/2019     FLP:   Lab Results   Component Value Date    CHOL 148 08/30/2018    HDL 46 08/30/2018    LDLCALC 82.0 08/30/2018    TRIG 100 08/30/2018    CHOLHDL 31.1 08/30/2018     CARDIAC:   Lab Results   Component Value Date    TROPONINI <0.012 02/18/2019       EKG 10/17/2018:  Atrial fibrillation with rate of 84 bpm     30  Day Event Monitor 11/24/2018:  An event monitor with auto-triggering capabilities was issued between 10/25/2018 and 11/24/2018.    Two patient triggered asymptomatic transmissions were made.  Both showed atrial fibrillation with relatively controlled rate.  On baseline recording, heart rate was  and on the second recording, it was  beats per minute.  The shortest RR   interval  noted was 480 milliseconds.  The longest RR interval noted was 1050 milliseconds.    Echo 10/22/2018:  CONCLUSIONS     1 - Mildly to moderately depressed left ventricular systolic function (EF 40-45%). Poor visualization of endocardial borders but EF appear mildly reduce in some views.     2 - Impaired LV relaxation, increased LVEDP.     3 - Normal right ventricular systolic function .     4 - The estimated PA systolic pressure is 38 mmHg.     Assessment/Plan:  Viri Carpio is a 89 y.o. female with a past medical history of HLD, who presents for a follow up appointment.    1. Acute Heart Failure Exacerbation with Reduced LVEF (40-45%)- Pt with recent hospital admission.  Symptoms now resolved.  Pt well compensated on exam.  Continue metoprolol succinate 100 mg daily, and olmesartan 40 mg daily.  Continue lasix 20 mg daily.  Continue daily weights.      2. New Onset Atrial Fibrillation-  Thirty day event monitor from 11/24/2018 shows rate controlled Afib.  Will continue to pursue rate control strategy at this time. Given reduced EF,continue metoprolol succinate 100 mg daily.  Continue Eliquis to 2.5 mg bid.          2. PAD- Stable.  Continue ASA and statin.      Follow up in 4 months      Total duration of face to face visit time 30 minutes.  Total time spent counseling greater than fifty percent of total visit time.  Counseling included discussion regarding imaging findings, diagnosis, possibilities, treatment options, risks and benefits.  The patient had many questions regarding the options and long-term effects.    Chucky Palacios MD, PhD  Interventional Cardiology

## 2019-02-27 ENCOUNTER — OFFICE VISIT (OUTPATIENT)
Dept: INTERNAL MEDICINE | Facility: CLINIC | Age: 84
End: 2019-02-27
Payer: MEDICARE

## 2019-02-27 VITALS
TEMPERATURE: 98 F | RESPIRATION RATE: 16 BRPM | HEIGHT: 62 IN | BODY MASS INDEX: 20.24 KG/M2 | WEIGHT: 110 LBS | SYSTOLIC BLOOD PRESSURE: 110 MMHG | OXYGEN SATURATION: 97 % | DIASTOLIC BLOOD PRESSURE: 60 MMHG | HEART RATE: 61 BPM

## 2019-02-27 DIAGNOSIS — G47.00 INSOMNIA, UNSPECIFIED TYPE: ICD-10-CM

## 2019-02-27 DIAGNOSIS — E78.2 MIXED HYPERLIPIDEMIA: ICD-10-CM

## 2019-02-27 DIAGNOSIS — I73.9 PERIPHERAL ARTERY DISEASE: ICD-10-CM

## 2019-02-27 DIAGNOSIS — R09.89 DEPRESSED LEFT VENTRICULAR EJECTION FRACTION: ICD-10-CM

## 2019-02-27 DIAGNOSIS — I48.20 CHRONIC ATRIAL FIBRILLATION: ICD-10-CM

## 2019-02-27 DIAGNOSIS — I10 BENIGN ESSENTIAL HTN: ICD-10-CM

## 2019-02-27 DIAGNOSIS — N18.30 CKD (CHRONIC KIDNEY DISEASE) STAGE 3, GFR 30-59 ML/MIN: ICD-10-CM

## 2019-02-27 DIAGNOSIS — I50.22 CHRONIC SYSTOLIC CONGESTIVE HEART FAILURE: Primary | ICD-10-CM

## 2019-02-27 PROBLEM — R06.02 SOB (SHORTNESS OF BREATH) ON EXERTION: Status: RESOLVED | Noted: 2018-10-17 | Resolved: 2019-02-27

## 2019-02-27 PROCEDURE — 99999 PR PBB SHADOW E&M-EST. PATIENT-LVL IV: CPT | Mod: PBBFAC,HCNC,, | Performed by: INTERNAL MEDICINE

## 2019-02-27 PROCEDURE — 99499 UNLISTED E&M SERVICE: CPT | Mod: HCNC,S$GLB,, | Performed by: INTERNAL MEDICINE

## 2019-02-27 PROCEDURE — 99499 RISK ADDL DX/OHS AUDIT: ICD-10-PCS | Mod: HCNC,S$GLB,, | Performed by: INTERNAL MEDICINE

## 2019-02-27 PROCEDURE — 99214 PR OFFICE/OUTPT VISIT, EST, LEVL IV, 30-39 MIN: ICD-10-PCS | Mod: HCNC,S$GLB,, | Performed by: INTERNAL MEDICINE

## 2019-02-27 PROCEDURE — 99214 OFFICE O/P EST MOD 30 MIN: CPT | Mod: HCNC,S$GLB,, | Performed by: INTERNAL MEDICINE

## 2019-02-27 PROCEDURE — 99999 PR PBB SHADOW E&M-EST. PATIENT-LVL IV: ICD-10-PCS | Mod: PBBFAC,HCNC,, | Performed by: INTERNAL MEDICINE

## 2019-02-27 RX ORDER — TEMAZEPAM 15 MG/1
15 CAPSULE ORAL NIGHTLY PRN
Qty: 30 CAPSULE | Refills: 4 | Status: SHIPPED | OUTPATIENT
Start: 2019-02-27 | End: 2019-03-27 | Stop reason: SDUPTHER

## 2019-02-27 NOTE — PROGRESS NOTES
Transitional Care Note  Subjective:       Patient ID: Viri Carpio is a 89 y.o. female.  Chief Complaint: Hospital Follow Up (TCC)    Family and/or Caretaker present at visit?  Yes.  Diagnostic tests reviewed/disposition: No diagnosic tests pending after this hospitalization.  Disease/illness education: YES  Home health/community services discussion/referrals: Patient does not have home health established from hospital visit.  They do not need home health.  If needed, we will set up home health for the patient.   Establishment or re-establishment of referral orders for community resources: No other necessary community resources.   Discussion with other health care providers: No discussion with other health care providers necessary.   HPI   89y/oWF went into flash pulmonary edema, requiring admission and gentle diuresis, 2/17/19.  She has been seen by Dr. Palacios,Cardiology.  Prior to admission, she had leg cramps, and diarrhea. Salt intake was unmonitored.  Known to have HTN, ch at fib, PVD. On anticoagulation. Had an EF of 45%, but mild LV dysfunction.  Now feels fine, and her weight has not changed.          Review of Systems   Constitutional: Negative.    HENT: Negative.    Eyes: Negative.    Respiratory: Negative for cough, choking, chest tightness, shortness of breath and wheezing.    Cardiovascular: Negative for chest pain, palpitations and leg swelling.   Gastrointestinal: Negative.    Endocrine: Negative.    Genitourinary: Positive for frequency.   Musculoskeletal: Negative.    Skin: Negative.    Neurological: Negative.    Hematological: Negative.    Psychiatric/Behavioral: Negative.        Objective:      Physical Exam   Constitutional: She appears well-developed and well-nourished.   HENT:   Head: Normocephalic and atraumatic.   Right Ear: External ear normal.   Left Ear: External ear normal.   Nose: Nose normal.   Mouth/Throat: Oropharynx is clear and moist.   Eyes: Conjunctivae and EOM are normal.  Pupils are equal, round, and reactive to light.   Neck: Normal range of motion. Neck supple.   Cardiovascular: Normal rate, regular rhythm and normal heart sounds.   Pulmonary/Chest: Effort normal and breath sounds normal.   Abdominal: Soft. Bowel sounds are normal.   Musculoskeletal: She exhibits no edema.   Neurological: She is alert.   Skin: Skin is warm and dry.   No breakdown   Psychiatric: She has a normal mood and affect. Her behavior is normal.   Nursing note and vitals reviewed.      Assessment:       1. Chronic systolic congestive heart failure    2. CKD (chronic kidney disease) stage 3, GFR 30-59 ml/min    3. Insomnia, unspecified type    4. Chronic atrial fibrillation    5. Depressed left ventricular ejection fraction    6. Benign essential HTN    7. Peripheral artery disease    8. Mixed hyperlipidemia      Stable, but while in the hospital had a low K, will recheck this  Plan:       Weigh herself daily.  If > 5# change, take 1 extra lasix that day.

## 2019-02-27 NOTE — PROGRESS NOTES
"Subjective:      Patient ID: Viri Carpio is a 89 y.o. female.    Chief Complaint: Hospital Follow Up (TCC)    HPI: 89 y.o. White female        Review of Systems    Objective:   /60 (BP Location: Left arm, Patient Position: Sitting, BP Method: Large (Manual))   Pulse 61   Temp 97.9 °F (36.6 °C) (Oral)   Resp 16   Ht 5' 2" (1.575 m)   Wt 49.9 kg (110 lb)   SpO2 97%   BMI 20.12 kg/m²     Physical Exam    Assessment:     No diagnosis found.  Plan:     There are no diagnoses linked to this encounter.    "

## 2019-03-27 DIAGNOSIS — G47.00 INSOMNIA, UNSPECIFIED TYPE: ICD-10-CM

## 2019-04-01 RX ORDER — TEMAZEPAM 15 MG/1
CAPSULE ORAL
Qty: 30 CAPSULE | Refills: 3 | Status: SHIPPED | OUTPATIENT
Start: 2019-04-01 | End: 2019-08-15 | Stop reason: SDUPTHER

## 2019-05-13 ENCOUNTER — TELEPHONE (OUTPATIENT)
Dept: FAMILY MEDICINE | Facility: CLINIC | Age: 84
End: 2019-05-13

## 2019-05-13 NOTE — TELEPHONE ENCOUNTER
----- Message from Natalia Velasco sent at 5/13/2019 10:28 AM CDT -----  Contact: [phone]/daughter  Patient is requesting to be seen today by Oh only. The heel of her foot is hurting and is swollen and patient is diabetic. Please call to schedule. Thanks

## 2019-05-14 ENCOUNTER — OFFICE VISIT (OUTPATIENT)
Dept: INTERNAL MEDICINE | Facility: CLINIC | Age: 84
End: 2019-05-14
Payer: MEDICARE

## 2019-05-14 ENCOUNTER — OFFICE VISIT (OUTPATIENT)
Dept: PODIATRY | Facility: CLINIC | Age: 84
End: 2019-05-14
Payer: MEDICARE

## 2019-05-14 VITALS
SYSTOLIC BLOOD PRESSURE: 100 MMHG | HEART RATE: 67 BPM | HEIGHT: 62 IN | RESPIRATION RATE: 16 BRPM | BODY MASS INDEX: 20.06 KG/M2 | BODY MASS INDEX: 20.08 KG/M2 | HEIGHT: 62 IN | DIASTOLIC BLOOD PRESSURE: 55 MMHG | WEIGHT: 109 LBS | WEIGHT: 109.13 LBS | HEART RATE: 75 BPM | SYSTOLIC BLOOD PRESSURE: 89 MMHG | DIASTOLIC BLOOD PRESSURE: 58 MMHG

## 2019-05-14 DIAGNOSIS — M77.51 TENDONITIS OF ANKLE, RIGHT: ICD-10-CM

## 2019-05-14 DIAGNOSIS — M79.671 PAIN OF RIGHT HEEL: Primary | ICD-10-CM

## 2019-05-14 DIAGNOSIS — M79.671 PAIN OF RIGHT HEEL: ICD-10-CM

## 2019-05-14 DIAGNOSIS — M89.8X7 RETROCALCANEAL EXOSTOSIS: ICD-10-CM

## 2019-05-14 DIAGNOSIS — I73.9 PERIPHERAL ARTERY DISEASE: Primary | ICD-10-CM

## 2019-05-14 PROCEDURE — 99999 PR PBB SHADOW E&M-EST. PATIENT-LVL IV: CPT | Mod: PBBFAC,HCNC,, | Performed by: INTERNAL MEDICINE

## 2019-05-14 PROCEDURE — 99999 PR PBB SHADOW E&M-EST. PATIENT-LVL IV: ICD-10-PCS | Mod: PBBFAC,HCNC,, | Performed by: PODIATRIST

## 2019-05-14 PROCEDURE — 99999 PR PBB SHADOW E&M-EST. PATIENT-LVL IV: ICD-10-PCS | Mod: PBBFAC,HCNC,, | Performed by: INTERNAL MEDICINE

## 2019-05-14 PROCEDURE — 1101F PT FALLS ASSESS-DOCD LE1/YR: CPT | Mod: HCNC,CPTII,S$GLB, | Performed by: INTERNAL MEDICINE

## 2019-05-14 PROCEDURE — 99213 PR OFFICE/OUTPT VISIT, EST, LEVL III, 20-29 MIN: ICD-10-PCS | Mod: HCNC,S$GLB,, | Performed by: INTERNAL MEDICINE

## 2019-05-14 PROCEDURE — 99213 OFFICE O/P EST LOW 20 MIN: CPT | Mod: HCNC,S$GLB,, | Performed by: INTERNAL MEDICINE

## 2019-05-14 PROCEDURE — 99203 PR OFFICE/OUTPT VISIT, NEW, LEVL III, 30-44 MIN: ICD-10-PCS | Mod: HCNC,S$GLB,, | Performed by: PODIATRIST

## 2019-05-14 PROCEDURE — 1101F PR PT FALLS ASSESS DOC 0-1 FALLS W/OUT INJ PAST YR: ICD-10-PCS | Mod: HCNC,CPTII,S$GLB, | Performed by: PODIATRIST

## 2019-05-14 PROCEDURE — 1101F PR PT FALLS ASSESS DOC 0-1 FALLS W/OUT INJ PAST YR: ICD-10-PCS | Mod: HCNC,CPTII,S$GLB, | Performed by: INTERNAL MEDICINE

## 2019-05-14 PROCEDURE — 1101F PT FALLS ASSESS-DOCD LE1/YR: CPT | Mod: HCNC,CPTII,S$GLB, | Performed by: PODIATRIST

## 2019-05-14 PROCEDURE — 99203 OFFICE O/P NEW LOW 30 MIN: CPT | Mod: HCNC,S$GLB,, | Performed by: PODIATRIST

## 2019-05-14 PROCEDURE — 99999 PR PBB SHADOW E&M-EST. PATIENT-LVL IV: CPT | Mod: PBBFAC,HCNC,, | Performed by: PODIATRIST

## 2019-05-14 RX ORDER — HYDROCORTISONE 25 MG/ML
LOTION TOPICAL
COMMUNITY
Start: 2019-05-03

## 2019-05-14 RX ORDER — KETOCONAZOLE 20 MG/ML
SHAMPOO, SUSPENSION TOPICAL
COMMUNITY
Start: 2019-03-18

## 2019-05-14 NOTE — PROGRESS NOTES
Subjective:      Patient ID: Viri Carpio is a 89 y.o. female.    Chief Complaint: Heel Pain (Right foot achiles tendon pain)    89 years old female presenting with posterior aspect of the right ankle.  She points posterior of the right heel .  Patient describes as throbbing pain.  Patient tells me pain started few days ago.  Patient denies any trauma or injury.  Patient is presenting with her daughter.  Patient was recently admitted and discharged from the hospital for congestive heart failure.  Patient states pain is only there when something makes a contact on the posterior aspect of the heel.  She tells me she does not have pain when she walk.  She is presenting with open toe shoe today.       Hemoglobin A1C   Date Value Ref Range Status   08/30/2018 5.4 4.0 - 5.6 % Final     Comment:     ADA Screening Guidelines:  5.7-6.4%  Consistent with prediabetes  >or=6.5%  Consistent with diabetes  High levels of fetal hemoglobin interfere with the HbA1C  assay. Heterozygous hemoglobin variants (HbS, HgC, etc)do  not significantly interfere with this assay.   However, presence of multiple variants may affect accuracy.     05/31/2018 5.5 4.0 - 5.6 % Final     Comment:     ADA Screening Guidelines:  5.7-6.4%  Consistent with prediabetes  >or=6.5%  Consistent with diabetes  High levels of fetal hemoglobin interfere with the HbA1C  assay. Heterozygous hemoglobin variants (HbS, HgC, etc)do  not significantly interfere with this assay.   However, presence of multiple variants may affect accuracy.     08/30/2017 5.5 4.0 - 5.6 % Final     Comment:     According to ADA guidelines, hemoglobin A1c <7.0% represents  optimal control in non-pregnant diabetic patients. Different  metrics may apply to specific patient populations.   Standards of Medical Care in Diabetes-2016.  For the purpose of screening for the presence of diabetes:  <5.7%     Consistent with the absence of diabetes  5.7-6.4%  Consistent with increasing risk for  diabetes   (prediabetes)  >or=6.5%  Consistent with diabetes  Currently, no consensus exists for use of hemoglobin A1c  for diagnosis of diabetes for children.  This Hemoglobin A1c assay has significant interference with fetal   hemoglobin   (HbF). The results are invalid for patients with abnormal amounts of   HbF,   including those with known Hereditary Persistence   of Fetal Hemoglobin. Heterozygous hemoglobin variants (HbAS, HbAC,   HbAD, HbAE, HbA2) do not significantly interfere with this assay;   however, presence of multiple variants in a sample may impact the %   interference.         Review of Systems   Constitution: Negative for decreased appetite, fever and malaise/fatigue.   HENT: Negative for congestion.    Cardiovascular: Negative for chest pain and leg swelling.   Respiratory: Negative for cough and shortness of breath.    Skin: Negative for color change, nail changes and rash.   Musculoskeletal: Positive for arthritis and joint swelling. Negative for joint pain and muscle weakness.        Right posterior heel pain   Gastrointestinal: Negative for bloating, abdominal pain, nausea and vomiting.   Neurological: Negative for headaches, numbness and weakness.   Psychiatric/Behavioral: Negative for altered mental status.             Past Medical History:   Diagnosis Date    Atrial fibrillation     Depression     Diabetes mellitus, type 2     Hyperlipidemia     Insomnia     Peripheral artery disease        Past Surgical History:   Procedure Laterality Date    APPENDECTOMY         History reviewed. No pertinent family history.    Social History     Socioeconomic History    Marital status:      Spouse name: Not on file    Number of children: Not on file    Years of education: Not on file    Highest education level: Not on file   Occupational History    Not on file   Social Needs    Financial resource strain: Not on file    Food insecurity:     Worry: Not on file     Inability: Not on  file    Transportation needs:     Medical: Not on file     Non-medical: Not on file   Tobacco Use    Smoking status: Former Smoker    Smokeless tobacco: Former User   Substance and Sexual Activity    Alcohol use: No     Alcohol/week: 0.0 oz    Drug use: No    Sexual activity: Not on file   Lifestyle    Physical activity:     Days per week: Not on file     Minutes per session: Not on file    Stress: Not on file   Relationships    Social connections:     Talks on phone: Not on file     Gets together: Not on file     Attends Judaism service: Not on file     Active member of club or organization: Not on file     Attends meetings of clubs or organizations: Not on file     Relationship status: Not on file   Other Topics Concern    Not on file   Social History Narrative    Not on file       Current Outpatient Medications   Medication Sig Dispense Refill    apixaban (ELIQUIS) 2.5 mg Tab Take 1 tablet (2.5 mg total) by mouth 2 (two) times daily. 180 tablet 3    aspirin (ECOTRIN) 81 MG EC tablet Take 81 mg by mouth once daily.      COMPR.STOCKING,KNEE,LONG,LARGE MISC       escitalopram oxalate (LEXAPRO) 10 MG tablet TAKE 1 TABLET EVERY DAY 90 tablet 1    furosemide (LASIX) 20 MG tablet Take 1 tablet (20 mg total) by mouth 2 (two) times daily for 3 days, THEN 1 tablet (20 mg total) once daily. (Patient taking differently: daily) 93 tablet 0    hydrocortisone 2.5 % lotion       ketoconazole (NIZORAL) 2 % shampoo       levothyroxine (SYNTHROID) 25 MCG tablet Take 1 tablet (25 mcg total) by mouth once daily. 90 tablet 3    lovastatin (MEVACOR) 20 MG tablet Take 1 tablet (20 mg total) by mouth every evening. 90 tablet 3    metoprolol succinate (TOPROL-XL) 100 MG 24 hr tablet Take 1 tablet (100 mg total) by mouth once daily. 30 tablet 11    nystatin-triamcinolone (MYCOLOG II) cream       olmesartan (BENICAR) 40 MG tablet Take 1 tablet (40 mg total) by mouth once daily. 90 tablet 3    temazepam (RESTORIL)  "15 mg Cap TAKE ONE CAPSULE BY MOUTH NIGHTLY AS NEEDED. 30 capsule 3    VOLTAREN 1 % Gel Apply 2 gram(s) to affected area 3 times a day as needed PRN MANAGEMENT  1     No current facility-administered medications for this visit.        Review of patient's allergies indicates:  No Known Allergies    Vitals:    05/14/19 1048   BP: (!) 89/55   Pulse: 67   Resp: 16   Weight: 49.4 kg (109 lb)   Height: 5' 2" (1.575 m)   PainSc:   6   PainLoc: Foot       Objective:      Physical Exam   Constitutional: She is oriented to person, place, and time. She appears well-developed and well-nourished. No distress.   Musculoskeletal: She exhibits tenderness. She exhibits no edema or deformity.   Neurological: She is alert and oriented to person, place, and time.   Skin: Skin is warm. Capillary refill takes 2 to 3 seconds. No erythema.   Psychiatric: She has a normal mood and affect. Her behavior is normal.       Vascular: Distal DP/PT pulses palpable 1/4. CRT < 3 sec to tips of toes. + vericosities noted to LEs. Warm to touch LE ankle to touch to toes    Dermatologic: No open lesions, lacerations or wounds. Interdigital spaces clean, dry and intact. No erythema, rubor, calor noted LE    Musculoskeletal: MMT 5/5 in DF/PF/Inv/Ev resistance with no reproduction of pain in any direction. Passive range of motion of ankle and pedal joints is painless. No calf tenderness LE, Compartments soft/compressible.   Right ankle:  Pain on palpation at the Achilles tendon insertion on the posterior aspect of the heel.  No pain along the Achilles tendon.     Neurological: Light touch, proprioception, and sharp/dull sensation are all intact. Protective threshold with the Kingston-Wienstein monofilament is intact b/l. Vibratory sensation intact.       Assessment:       Encounter Diagnoses   Name Primary?    Pain of right heel - Right Foot Yes    Retrocalcaneal exostosis - Right Foot          Plan:       Viri was seen today for heel pain.    Diagnoses " and all orders for this visit:    Pain of right heel - Right Foot  -     X-Ray Ankle Complete Right; Future    Retrocalcaneal exostosis - Right Foot      I counseled the patient on her conditions, their implications and medical management.    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     -Rx.  Right ankle x-ray: + retrocalcaneal exostosis with Jill's deformity  -I recommend z flex.  Personally showed pics of heel booties (z flex) from online.  Her daughter took a picture of it.   -stressed the importance of offloading the heel to prevent worsening of the symptoms.   -The nature of the condition, options for management, as well as potential risks and complications were discussed in detail with patient. Patient was amenable to my recommendations and left my office fully informed and will follow up as instructed or sooner if necessary.    -f/u prn

## 2019-05-14 NOTE — PROGRESS NOTES
"Subjective:      Patient ID: Viri Carpio is a 89 y.o. female.    Chief Complaint: Heel Pain    HPI: 89 y.o. White female, here with her daughter. Pt states that several days ago she developed right heel pain. This is a constant throbbing.  No previous trauma. But it hurts whenever anything touches it.  Has not taken anything to make it better.  Worse to stand or walk on.    DX: PVD,HTN,AT fib,CHF,Hyperlipidemia,anti coagulation.        Review of Systems   Constitutional: Negative.    HENT: Negative for nosebleeds.    Respiratory: Negative for cough, shortness of breath and wheezing.    Cardiovascular: Negative for chest pain, palpitations and leg swelling.   Gastrointestinal: Negative for anal bleeding and blood in stool.   Genitourinary: Negative for hematuria.   Musculoskeletal: Positive for gait problem.   Neurological: Negative for numbness.       Objective:   BP (!) 100/58   Pulse 75   Ht 5' 2" (1.575 m)   Wt 49.5 kg (109 lb 1.6 oz)   BMI 19.95 kg/m²     Physical Exam   Constitutional: She is oriented to person, place, and time. She appears well-developed and well-nourished.   HENT:   Head: Normocephalic and atraumatic.   Nose: Nose normal.   Mouth/Throat: Oropharynx is clear and moist.   Eyes: Conjunctivae are normal.   Neck: No JVD present.   Cardiovascular:  Occasional extrasystoles are present. PMI is not displaced.   Pulses:       Radial pulses are 1+ on the right side, and 1+ on the left side.        Dorsalis pedis pulses are 1+ on the right side, and 1+ on the left side.        Posterior tibial pulses are 1+ on the right side, and 1+ on the left side.   Pulmonary/Chest: Effort normal and breath sounds normal.   Abdominal: Soft. Bowel sounds are normal.   Musculoskeletal:        Right foot: There is decreased range of motion, tenderness and bony tenderness. There is no swelling, normal capillary refill and no deformity.        Feet:    Feet:   Right Foot:   Skin Integrity: Negative for skin " breakdown.   Left Foot:   Skin Integrity: Negative for skin breakdown.   Neurological: She is alert and oriented to person, place, and time.   Skin: Skin is warm and dry.   Psychiatric: She has a normal mood and affect. Her behavior is normal.   Nursing note and vitals reviewed.      Assessment:     1. Peripheral artery disease    2. Tendonitis of ankle, right    3. Pain of right heel    Will reefer to Podiatry to see if a boot could help, or exercises, or steroid?    Plan:     Peripheral artery disease    Tendonitis of ankle, right  -     Ambulatory referral to Podiatry    Pain of right heel  -     Ambulatory referral to Podiatry

## 2019-06-25 ENCOUNTER — OFFICE VISIT (OUTPATIENT)
Dept: CARDIOLOGY | Facility: CLINIC | Age: 84
End: 2019-06-25
Payer: MEDICARE

## 2019-06-25 VITALS
HEART RATE: 72 BPM | HEIGHT: 62 IN | WEIGHT: 108 LBS | DIASTOLIC BLOOD PRESSURE: 68 MMHG | BODY MASS INDEX: 19.88 KG/M2 | SYSTOLIC BLOOD PRESSURE: 128 MMHG | OXYGEN SATURATION: 96 %

## 2019-06-25 DIAGNOSIS — I73.9 PERIPHERAL ARTERY DISEASE: ICD-10-CM

## 2019-06-25 DIAGNOSIS — R09.89 DEPRESSED LEFT VENTRICULAR EJECTION FRACTION: ICD-10-CM

## 2019-06-25 DIAGNOSIS — I50.22 CHRONIC SYSTOLIC CONGESTIVE HEART FAILURE: ICD-10-CM

## 2019-06-25 DIAGNOSIS — E78.2 MIXED HYPERLIPIDEMIA: ICD-10-CM

## 2019-06-25 DIAGNOSIS — I48.20 CHRONIC ATRIAL FIBRILLATION: Primary | ICD-10-CM

## 2019-06-25 DIAGNOSIS — I10 BENIGN ESSENTIAL HTN: ICD-10-CM

## 2019-06-25 PROCEDURE — 1101F PR PT FALLS ASSESS DOC 0-1 FALLS W/OUT INJ PAST YR: ICD-10-PCS | Mod: HCNC,CPTII,S$GLB, | Performed by: INTERNAL MEDICINE

## 2019-06-25 PROCEDURE — 99215 PR OFFICE/OUTPT VISIT, EST, LEVL V, 40-54 MIN: ICD-10-PCS | Mod: HCNC,S$GLB,, | Performed by: INTERNAL MEDICINE

## 2019-06-25 PROCEDURE — 99999 PR PBB SHADOW E&M-EST. PATIENT-LVL III: CPT | Mod: PBBFAC,HCNC,, | Performed by: INTERNAL MEDICINE

## 2019-06-25 PROCEDURE — 99215 OFFICE O/P EST HI 40 MIN: CPT | Mod: HCNC,S$GLB,, | Performed by: INTERNAL MEDICINE

## 2019-06-25 PROCEDURE — 1101F PT FALLS ASSESS-DOCD LE1/YR: CPT | Mod: HCNC,CPTII,S$GLB, | Performed by: INTERNAL MEDICINE

## 2019-06-25 PROCEDURE — 99999 PR PBB SHADOW E&M-EST. PATIENT-LVL III: ICD-10-PCS | Mod: PBBFAC,HCNC,, | Performed by: INTERNAL MEDICINE

## 2019-06-25 RX ORDER — DOCUSATE SODIUM 100 MG
CAPSULE ORAL
COMMUNITY
Start: 2019-05-20

## 2019-06-25 RX ORDER — FUROSEMIDE 20 MG/1
20 TABLET ORAL DAILY
Qty: 90 TABLET | Refills: 3 | Status: SHIPPED | OUTPATIENT
Start: 2019-06-25

## 2019-06-25 RX ORDER — OLMESARTAN MEDOXOMIL 40 MG/1
40 TABLET ORAL DAILY
Qty: 90 TABLET | Refills: 3 | Status: SHIPPED | OUTPATIENT
Start: 2019-06-25 | End: 2019-10-04 | Stop reason: SDUPTHER

## 2019-06-25 NOTE — LETTER
June 25, 2019      Alesha Casiano MD  [address]  [address]   [facility] [address]           [facility]  [address]   [facility] [address]  Phone: [phone]  Fax: [phone]          Patient: Viri Carpio   MR Number: [id number]   YOB: 1927   Date of Visit: 6/25/2019       Dear Dr. Alesha Casiano:    Thank you for referring Viri Carpio to me for evaluation. Attached you will find relevant portions of my assessment and plan of care.    If you have questions, please do not hesitate to call me. I look forward to following Viri Carpio along with you.    Sincerely,    Chucky Palacios MD PhD    Enclosure  CC:  No Recipients    If you would like to receive this communication electronically, please contact [email or web address] or [phone] to request more information on [employer] Link access.    For providers and/or their staff who would like to refer a patient to [facility], please contact us through our one-stop-shop provider referral line, [facility], at [phone].    If you feel you have received this communication in error or would no longer like to receive these types of communications, please e-mail [email or web address]

## 2019-06-25 NOTE — PATIENT INSTRUCTIONS
Assessment/Plan:  Viri Carpio is a 89 y.o. female with a past medical history of HLD, who presents for a follow up appointment.  Currently doing well with no complaints.    1. Acute Heart Failure Exacerbation with Reduced LVEF (40-45%)- Pt with recent hospital admission.  Symptoms now resolved.  Pt well compensated on exam.  Continue metoprolol succinate 100 mg daily, and olmesartan 40 mg daily.  Continue lasix 20 mg daily.  Continue daily weights.      2. New Onset Atrial Fibrillation-  Thirty day event monitor from 11/24/2018 shows rate controlled Afib.  Will continue to pursue rate control strategy at this time. Given reduced EF,continue metoprolol succinate 100 mg daily.  Continue Eliquis to 2.5 mg bid.          3. PAD- Stable.  Continue ASA and statin.      Follow up in 4 months

## 2019-06-25 NOTE — PROGRESS NOTES
[facility]    Chief Complaint   Patient presents with    Follow-up       Patient ID: Viri Carpio is a 89 y.o. female with a past medical history of HLD, who presents for a follow-up appointment.  Pertinent history/events are as follows:     -Pt kindly referred by Dr. Casiano for evaluation of new onset Afib, SOB, LE edema.    -At our initial clinic visit on 10/17/2018, Mrs. Carpio is accompanied by her daughter.  She reported onset of ATKINSON beginning approximately 2 weeks ago.  Around the same time, she noticed swelling in both legs/ankles.  She has no chest pain.  EKG done today by PCP shows new onset atrial fibrillation with rate of 83 bpm.    Plan: Pt well rate controlled and asymptomatic on atenolol 100 mg daily.  Will pursue rate control strategy at this time.  Pt is >81 hyears old and weighs 55 kg.  Decrease Eliquis to 2.5 mg bid.  Place 30 day event monitor.  Check echo.  CXR, cmp, proBNP level today.  Continue ASA and statin for PAD, which is stable.     -Studies from 10/17/2018 revealed: proBNP elevated at 2210.  CXR with bilateral perihilar and basilar opacities with interstitial prominence concerning for edema.  There is blunting of the costophrenic angles bilaterally suspicious for pleural fluid.    -At follow up clinic visit on 10/26/2018,  reported feeling much better since our initial visit on 10/17/2018.  Weight has decreased from 123 pounds to 110.  LE edema and SOB resolved.  Repeat CXR from 10/24/2018 shows significant improvement of pulmonary edema.  Cr stable.  Now taking lasix 20 mg daily instead of bid. 30 day event monitor in place.  Echo from 10/22/2018 shows mildly to moderately depressed left ventricular systolic function (EF 40-45%). (poor visualization of endocardial borders but EF appear mildly reduce in some views).  impaired LV relaxation, increased LVEDP; normal right ventricular systolic function; estimated PA systolic pressure is 38 mmHg.  Plan:  Symptoms now resolved.  Pt well compensated on exam.  Etiology unclear.  Possibilities include Afib induced cardiomyopathy, and ischemia.  Check nuclear stress test to evaluate for ischemia.  Given reduced EF, will discontinue atenolol 100 mg daily and start metoprolol succinate 100 mg daily. Pt is >81 years old and weighs 55 kg.  Continue Eliquis to 2.5 mg bid.  30 day event monitor in place.          -At clinic visit on 12/11/2018, Mrs. Carpio is accompanied by her daughter.  Reported no chest pain, SOB, or significant LE edema.  Awaiting 30 day event monitor results.  Pt and her daughter decided not have stress test done.   Blood pressure today  Is 124/70 with pulse of 77 bpm.  Plan: Continue metoprolol succinate 100 mg daily, and olmesartan 40 mg daily.  Continue lasix 20 mg daily.  Continue daily weights.    Regarding new onset Afib, awaiting 30 day event monitor results.  Pt appears to be in Afib today by auscultation.  Will continue to pursue rate control strategy at this time. Given reduced EF,continue metoprolol succinate 100 mg daily.  Continue Eliquis to 2.5 mg bid.    -On 2/17/2019, pt admitted with weakness (details per hospital course below):    Patient found to be saturating 88% on room air, no history of O2 at home.  Chest x-ray consistent with vascular congestion and pulmonary edema of CHF.  Patient had echocardiogram done in August 2018 which showed a left ventricular ejection fraction 40-45% and diastolic dysfunction.  Patient started on IV Lasix with good urinary output.  Admitted with acute CHF exacerbation for further evaluation treatment.     Hospital Course:   Patient admitted and started on IV Lasix with good urinary output.  Patient's breathing improved decreasing requirements of supplemental O2 down to 2 L but still desaturating when off of O2.  Lasix continued and patient continued to diurese well.  On 2nd day of admission, patient off supplemental O2 at rest.  6 min walk test completed  with respiratory therapy which showed only desaturation to 92%.  Patient clinically improved and medically stable for discharge home with instructions to follow-up with PCP and Cardiology in 1-2 weeks.  Will increase Lasix dose for 3 more days and then return to normal Lasix dose.  Counseled patient on low-salt diet.    -At clinic visit on 2/26/2019, Mrs. Carpio reported feeling much better since hospital discharge.  She has no chest pain, SOB, or LE edema.  Now taking lasix 20 mg daily.  Thirty day event monitor from 11/24/2018 shows rate controlled Afib.    Plan:   Acute Heart Failure Exacerbation with Reduced LVEF (40-45%)- Pt with recent hospital admission.  Symptoms now resolved.  Pt well compensated on exam.  Continue metoprolol succinate 100 mg daily, and olmesartan 40 mg daily.  Continue lasix 20 mg daily.  Continue daily weights.    New Onset Atrial Fibrillation-  Thirty day event monitor from 11/24/2018 shows rate controlled Afib.  Will continue to pursue rate control strategy at this time. Given reduced EF,continue metoprolol succinate 100 mg daily.  Continue Eliquis to 2.5 mg bid.        PAD- Stable.  Continue ASA and statin.    HPI:  Mrs. Carpio is accompanied by her daughter.  She reports no chest pain, SOB, or LE edema.  Blood pressure today 128/68 with pulse of 72.     Past Medical History:   Diagnosis Date    Atrial fibrillation     Depression     Diabetes mellitus, type 2     Hyperlipidemia     Insomnia     Peripheral artery disease      Past Surgical History:   Procedure Laterality Date    APPENDECTOMY       Social History     Socioeconomic History    Marital status:      Spouse name: Not on file    Number of children: Not on file    Years of education: Not on file    Highest education level: Not on file   Occupational History    Not on file   Social Needs    Financial resource strain: Not on file    Food insecurity:     Worry: Not on file     Inability: Not on file     Transportation needs:     Medical: Not on file     Non-medical: Not on file   Tobacco Use    Smoking status: Former Smoker    Smokeless tobacco: Former User   Substance and Sexual Activity    Alcohol use: No     Alcohol/week: 0.0 oz    Drug use: No    Sexual activity: Not on file   Lifestyle    Physical activity:     Days per week: Not on file     Minutes per session: Not on file    Stress: Not on file   Relationships    Social connections:     Talks on phone: Not on file     Gets together: Not on file     Attends Yazdanism service: Not on file     Active member of club or organization: Not on file     Attends meetings of clubs or organizations: Not on file     Relationship status: Not on file   Other Topics Concern    Not on file   Social History Narrative    Not on file     No family history on file.    Review of patient's allergies indicates:  No Known Allergies    Medication List with Changes/Refills   Current Medications    APIXABAN (ELIQUIS) 2.5 MG TAB    Take 1 tablet (2.5 mg total) by mouth 2 (two) times daily.    ASPIRIN (ECOTRIN) 81 MG EC TABLET    Take 81 mg by mouth once daily.    COMPR.STOCKING,KNEE,LONG,LARGE MISC        ESCITALOPRAM OXALATE (LEXAPRO) 10 MG TABLET    TAKE 1 TABLET EVERY DAY    FUROSEMIDE (LASIX) 20 MG TABLET    Take 1 tablet (20 mg total) by mouth 2 (two) times daily for 3 days, THEN 1 tablet (20 mg total) once daily.    HYDROCORTISONE 2.5 % LOTION        KETOCONAZOLE (NIZORAL) 2 % SHAMPOO        LEVOTHYROXINE (SYNTHROID) 25 MCG TABLET    Take 1 tablet (25 mcg total) by mouth once daily.    LOVASTATIN (MEVACOR) 20 MG TABLET    Take 1 tablet (20 mg total) by mouth every evening.    METOPROLOL SUCCINATE (TOPROL-XL) 100 MG 24 HR TABLET    Take 1 tablet (100 mg total) by mouth once daily.    NYSTATIN-TRIAMCINOLONE (MYCOLOG II) CREAM        OLMESARTAN (BENICAR) 40 MG TABLET    Take 1 tablet (40 mg total) by mouth once daily.    STOOL SOFTENER 100 MG CAPSULE        TEMAZEPAM  "(RESTORIL) 15 MG CAP    TAKE ONE CAPSULE BY MOUTH NIGHTLY AS NEEDED.    VOLTAREN 1 % GEL    Apply 2 gram(s) to affected area 3 times a day as needed PRN MANAGEMENT       Review of Systems  Constitution: Denies chills, fever, and sweats.  HENT: Denies headaches or blurry vision.  Cardiovascular: Denies chest pain or irregular heart beat.  Respiratory: Denies cough or shortness of breath.  Gastrointestinal: Denies abdominal pain, nausea, or vomiting.  Musculoskeletal: Denies muscle cramps.  Neurological: Denies dizziness or focal weakness.  Psychiatric/Behavioral: Normal mental status.  Hematologic/Lymphatic: Denies bleeding problem or easy bruising/bleeding.  Skin: Denies rash or suspicious lesions    Physical Examination  /68 (BP Location: Left arm, Patient Position: Sitting, BP Method: Medium (Automatic))   Pulse 72   Ht 5' 2" (1.575 m)   Wt 49 kg (108 lb)   SpO2 96%   BMI 19.75 kg/m²     Constitutional: No acute distress, conversant  HEENT: Sclera anicteric, Pupils equal, round and reactive to light, extraocular motions intact, Oropharynx clear  Neck: No JVD, no carotid bruits  Cardiovascular: Irregularly irregular, no rubs or gallops  Pulmonary: Clear to auscultation bilaterally  Abdominal: Abdomen soft, nontender, nondistended, positive bowel sounds  Extremities: No lower extremity edema,   Pulses:  Carotid pulses are 2+ on the right side, and 2+ on the left side.  Radial pulses are 2+ on the right side, and 2+ on the left side.   Femoral pulses are 2+ on the right side, and 2+ on the left side.  Skin: No ecchymosis, erythema, or ulcers  Psych: Alert and oriented x 3, appropriate affect  Neuro: CNII-XII intact, no focal deficits    Labs:  Most Recent Data  CBC:   Lab Results   Component Value Date    WBC 4.86 02/19/2019    HGB 11.6 (L) 02/19/2019    HCT 36.0 (L) 02/19/2019     02/19/2019    MCV 93 02/19/2019    RDW 15.3 (H) 02/19/2019     BMP:   Lab Results   Component Value Date     " 02/27/2019    K 3.9 02/27/2019    CL 99 02/27/2019    CO2 28 02/27/2019    BUN 17 02/27/2019    CREATININE 0.77 02/27/2019    GLU 95 02/27/2019    CALCIUM 9.1 02/27/2019    MG 1.5 (L) 02/27/2019     LFTS;   Lab Results   Component Value Date    PROT 6.3 02/19/2019    ALBUMIN 2.9 (L) 02/19/2019    BILITOT 0.8 02/19/2019    AST 15 02/19/2019    ALKPHOS 78 02/19/2019    ALT 7 (L) 02/19/2019     COAGS:   Lab Results   Component Value Date    INR 1.6 (H) 02/17/2019     FLP:   Lab Results   Component Value Date    CHOL 148 08/30/2018    HDL 46 08/30/2018    LDLCALC 82.0 08/30/2018    TRIG 100 08/30/2018    CHOLHDL 31.1 08/30/2018     CARDIAC:   Lab Results   Component Value Date    TROPONINI <0.012 02/18/2019       EKG 10/17/2018:  Atrial fibrillation with rate of 84 bpm     30  Day Event Monitor 11/24/2018:  An event monitor with auto-triggering capabilities was issued between 10/25/2018 and 11/24/2018.    Two patient triggered asymptomatic transmissions were made.  Both showed atrial fibrillation with relatively controlled rate.  On baseline recording, heart rate was  and on the second recording, it was  beats per minute.  The shortest RR   interval noted was 480 milliseconds.  The longest RR interval noted was 1050 milliseconds.    Echo 10/22/2018:  CONCLUSIONS     1 - Mildly to moderately depressed left ventricular systolic function (EF 40-45%). Poor visualization of endocardial borders but EF appear mildly reduce in some views.     2 - Impaired LV relaxation, increased LVEDP.     3 - Normal right ventricular systolic function .     4 - The estimated PA systolic pressure is 38 mmHg.     Assessment/Plan:  Viri Carpio is a 89 y.o. female with a past medical history of HLD, who presents for a follow up appointment.  Currently doing well with no complaints.    1. Acute Heart Failure Exacerbation with Reduced LVEF (40-45%)- Pt with recent hospital admission.  Symptoms now resolved.  Pt well compensated on  exam.  Continue metoprolol succinate 100 mg daily, and olmesartan 40 mg daily.  Continue lasix 20 mg daily.  Continue daily weights.      2. New Onset Atrial Fibrillation-  Thirty day event monitor from 11/24/2018 shows rate controlled Afib.  Will continue to pursue rate control strategy at this time. Given reduced EF,continue metoprolol succinate 100 mg daily.  Continue Eliquis to 2.5 mg bid.          3. PAD- Stable.  Continue ASA and statin.      Follow up in 4 months      Total duration of face to face visit time 30 minutes.  Total time spent counseling greater than fifty percent of total visit time.  Counseling included discussion regarding imaging findings, diagnosis, possibilities, treatment options, risks and benefits.  The patient had many questions regarding the options and long-term effects.    Chucky Palacios MD, PhD  Interventional Cardiology

## 2019-08-15 DIAGNOSIS — G47.00 INSOMNIA, UNSPECIFIED TYPE: ICD-10-CM

## 2019-08-20 ENCOUNTER — TELEPHONE (OUTPATIENT)
Dept: INTERNAL MEDICINE | Facility: CLINIC | Age: 84
End: 2019-08-20

## 2019-08-20 RX ORDER — TEMAZEPAM 15 MG/1
CAPSULE ORAL
Qty: 30 CAPSULE | Refills: 2 | Status: SHIPPED | OUTPATIENT
Start: 2019-08-20 | End: 2019-11-27 | Stop reason: SDUPTHER

## 2019-08-24 DIAGNOSIS — G47.00 INSOMNIA, UNSPECIFIED TYPE: ICD-10-CM

## 2019-08-28 RX ORDER — TEMAZEPAM 15 MG/1
CAPSULE ORAL
Qty: 30 CAPSULE | Refills: 3 | OUTPATIENT
Start: 2019-08-28

## 2019-09-04 ENCOUNTER — OFFICE VISIT (OUTPATIENT)
Dept: PODIATRY | Facility: CLINIC | Age: 84
End: 2019-09-04
Payer: MEDICARE

## 2019-09-04 ENCOUNTER — TELEPHONE (OUTPATIENT)
Dept: PODIATRY | Facility: CLINIC | Age: 84
End: 2019-09-04

## 2019-09-04 VITALS
HEART RATE: 86 BPM | SYSTOLIC BLOOD PRESSURE: 84 MMHG | DIASTOLIC BLOOD PRESSURE: 53 MMHG | HEIGHT: 62 IN | WEIGHT: 108 LBS | BODY MASS INDEX: 19.88 KG/M2

## 2019-09-04 DIAGNOSIS — L60.0 INGROWN NAIL: Primary | ICD-10-CM

## 2019-09-04 PROCEDURE — 11750 EXCISION NAIL&NAIL MATRIX: CPT | Mod: TA,HCNC,S$GLB, | Performed by: PODIATRIST

## 2019-09-04 PROCEDURE — 99999 PR PBB SHADOW E&M-EST. PATIENT-LVL III: ICD-10-PCS | Mod: PBBFAC,HCNC,, | Performed by: PODIATRIST

## 2019-09-04 PROCEDURE — 99999 PR PBB SHADOW E&M-EST. PATIENT-LVL III: CPT | Mod: PBBFAC,HCNC,, | Performed by: PODIATRIST

## 2019-09-04 PROCEDURE — 11750 NAIL REMOVAL: ICD-10-PCS | Mod: TA,HCNC,S$GLB, | Performed by: PODIATRIST

## 2019-09-04 PROCEDURE — 99213 OFFICE O/P EST LOW 20 MIN: CPT | Mod: 25,HCNC,S$GLB, | Performed by: PODIATRIST

## 2019-09-04 PROCEDURE — 3288F FALL RISK ASSESSMENT DOCD: CPT | Mod: HCNC,CPTII,S$GLB, | Performed by: PODIATRIST

## 2019-09-04 PROCEDURE — 3288F PR FALLS RISK ASSESSMENT DOCUMENTED: ICD-10-PCS | Mod: HCNC,CPTII,S$GLB, | Performed by: PODIATRIST

## 2019-09-04 PROCEDURE — 99213 PR OFFICE/OUTPT VISIT, EST, LEVL III, 20-29 MIN: ICD-10-PCS | Mod: 25,HCNC,S$GLB, | Performed by: PODIATRIST

## 2019-09-04 PROCEDURE — 1100F PR PT FALLS ASSESS DOC 2+ FALLS/FALL W/INJURY/YR: ICD-10-PCS | Mod: HCNC,CPTII,S$GLB, | Performed by: PODIATRIST

## 2019-09-04 PROCEDURE — 1100F PTFALLS ASSESS-DOCD GE2>/YR: CPT | Mod: HCNC,CPTII,S$GLB, | Performed by: PODIATRIST

## 2019-09-04 RX ORDER — ACETAMINOPHEN 500 MG/1
CAPSULE, LIQUID FILLED ORAL
COMMUNITY
Start: 2019-07-24

## 2019-09-04 NOTE — TELEPHONE ENCOUNTER
----- Message from Xenia Ross sent at 9/3/2019  5:28 PM CDT -----  Contact: daughter, [phone]  States patient has an infected ingrown toe nail and requests for her to be seen as soon as possible. Please advise.

## 2019-09-04 NOTE — PROGRESS NOTES
Subjective:      Patient ID: Viri Carpio is a 89 y.o. female.    Chief Complaint: Ingrown Toenail (Left hallux pt states painful to touch 10/10)    89 years old female presenting with posterior aspect of the right ankle.  She points posterior of the right heel .  Patient describes as throbbing pain.  Patient tells me pain started few days ago.  Patient denies any trauma or injury.  Patient is presenting with her daughter.  Patient was recently admitted and discharged from the hospital for congestive heart failure.  Patient states pain is only there when something makes a contact on the posterior aspect of the heel.  She tells me she does not have pain when she walk.  She is presenting with open toe shoe today.     9/4/19: pt is presenting with different problem. Pt is presenting with new onset of pain of left hallux. f/u for ingrown toenail left hallux medial border. With her daughter today. First noticed some drainage with redness couple days ago. Complains of pain. It is aching. Has been soaking. She tells me she is doing ok from heel standpoint.       Hemoglobin A1C   Date Value Ref Range Status   08/30/2018 5.4 4.0 - 5.6 % Final     Comment:     ADA Screening Guidelines:  5.7-6.4%  Consistent with prediabetes  >or=6.5%  Consistent with diabetes  High levels of fetal hemoglobin interfere with the HbA1C  assay. Heterozygous hemoglobin variants (HbS, HgC, etc)do  not significantly interfere with this assay.   However, presence of multiple variants may affect accuracy.     05/31/2018 5.5 4.0 - 5.6 % Final     Comment:     ADA Screening Guidelines:  5.7-6.4%  Consistent with prediabetes  >or=6.5%  Consistent with diabetes  High levels of fetal hemoglobin interfere with the HbA1C  assay. Heterozygous hemoglobin variants (HbS, HgC, etc)do  not significantly interfere with this assay.   However, presence of multiple variants may affect accuracy.     08/30/2017 5.5 4.0 - 5.6 % Final     Comment:     According to ADA  guidelines, hemoglobin A1c <7.0% represents  optimal control in non-pregnant diabetic patients. Different  metrics may apply to specific patient populations.   Standards of Medical Care in Diabetes-2016.  For the purpose of screening for the presence of diabetes:  <5.7%     Consistent with the absence of diabetes  5.7-6.4%  Consistent with increasing risk for diabetes   (prediabetes)  >or=6.5%  Consistent with diabetes  Currently, no consensus exists for use of hemoglobin A1c  for diagnosis of diabetes for children.  This Hemoglobin A1c assay has significant interference with fetal   hemoglobin   (HbF). The results are invalid for patients with abnormal amounts of   HbF,   including those with known Hereditary Persistence   of Fetal Hemoglobin. Heterozygous hemoglobin variants (HbAS, HbAC,   HbAD, HbAE, HbA2) do not significantly interfere with this assay;   however, presence of multiple variants in a sample may impact the %   interference.         Review of Systems   Constitution: Negative for decreased appetite, fever and malaise/fatigue.   HENT: Negative for congestion.    Cardiovascular: Negative for chest pain and leg swelling.   Respiratory: Negative for cough and shortness of breath.    Skin: Positive for color change. Negative for nail changes and rash.   Musculoskeletal: Positive for arthritis and joint swelling. Negative for joint pain and muscle weakness.   Gastrointestinal: Negative for bloating, abdominal pain, nausea and vomiting.   Neurological: Negative for headaches, numbness and weakness.   Psychiatric/Behavioral: Negative for altered mental status.             Past Medical History:   Diagnosis Date    Atrial fibrillation     Depression     Diabetes mellitus, type 2     Hyperlipidemia     Insomnia     Peripheral artery disease        Past Surgical History:   Procedure Laterality Date    APPENDECTOMY         No family history on file.    Social History     Socioeconomic History    Marital  status:      Spouse name: Not on file    Number of children: Not on file    Years of education: Not on file    Highest education level: Not on file   Occupational History    Not on file   Social Needs    Financial resource strain: Not on file    Food insecurity:     Worry: Not on file     Inability: Not on file    Transportation needs:     Medical: Not on file     Non-medical: Not on file   Tobacco Use    Smoking status: Former Smoker    Smokeless tobacco: Former User   Substance and Sexual Activity    Alcohol use: No     Alcohol/week: 0.0 oz    Drug use: No    Sexual activity: Not on file   Lifestyle    Physical activity:     Days per week: Not on file     Minutes per session: Not on file    Stress: Not on file   Relationships    Social connections:     Talks on phone: Not on file     Gets together: Not on file     Attends Buddhism service: Not on file     Active member of club or organization: Not on file     Attends meetings of clubs or organizations: Not on file     Relationship status: Not on file   Other Topics Concern    Not on file   Social History Narrative    Not on file       Current Outpatient Medications   Medication Sig Dispense Refill    acetaminophen (TYLENOL) 500 mg Cap       apixaban (ELIQUIS) 2.5 mg Tab Take 1 tablet (2.5 mg total) by mouth 2 (two) times daily. 180 tablet 3    aspirin (ECOTRIN) 81 MG EC tablet Take 81 mg by mouth once daily.      COMPR.STOCKING,KNEE,LONG,LARGE MISC       escitalopram oxalate (LEXAPRO) 10 MG tablet TAKE 1 TABLET EVERY DAY 90 tablet 1    furosemide (LASIX) 20 MG tablet Take 1 tablet (20 mg total) by mouth once daily. 90 tablet 3    hydrocortisone 2.5 % lotion       ketoconazole (NIZORAL) 2 % shampoo       levothyroxine (SYNTHROID) 25 MCG tablet Take 1 tablet (25 mcg total) by mouth once daily. 90 tablet 3    lovastatin (MEVACOR) 20 MG tablet Take 1 tablet (20 mg total) by mouth every evening. 90 tablet 3    metoprolol succinate  "(TOPROL-XL) 100 MG 24 hr tablet Take 1 tablet (100 mg total) by mouth once daily. 30 tablet 11    nystatin-triamcinolone (MYCOLOG II) cream       olmesartan (BENICAR) 40 MG tablet Take 1 tablet (40 mg total) by mouth once daily. 90 tablet 3    STOOL SOFTENER 100 mg capsule       temazepam (RESTORIL) 15 mg Cap TAKE ONE CAPSULE BY MOUTH AT BEDTIME IF NEEDED 30 capsule 2    VOLTAREN 1 % Gel Apply 2 gram(s) to affected area 3 times a day as needed PRN MANAGEMENT  1     No current facility-administered medications for this visit.        Review of patient's allergies indicates:  No Known Allergies    Vitals:    09/04/19 1546   BP: (!) 84/53   Pulse: 86   Weight: 49 kg (108 lb)   Height: 5' 2" (1.575 m)   PainSc: 0-No pain       Objective:      Physical Exam   Constitutional: She is oriented to person, place, and time. She appears well-developed and well-nourished. No distress.   Musculoskeletal: She exhibits tenderness. She exhibits no edema or deformity.   Neurological: She is alert and oriented to person, place, and time.   Skin: Skin is warm. Capillary refill takes 2 to 3 seconds. No erythema.   Psychiatric: She has a normal mood and affect. Her behavior is normal.       Vascular: Distal DP/PT pulses palpable 1/4. CRT < 3 sec to tips of toes. + vericosities noted to LEs. Warm to touch LE ankle to touch to toes    Dermatologic:   L hallux: erythema, rubor along the medial border. +scant pus. No malodor.     Musculoskeletal: MMT 5/5 in DF/PF/Inv/Ev resistance with no reproduction of pain in any direction. Passive range of motion of ankle and pedal joints is painless. No calf tenderness LE, Compartments soft/compressible.   L hallux: ttp medial border.     Neurological: Light touch, proprioception, and sharp/dull sensation are all intact. Protective threshold with the Calamus-Wienstein monofilament is intact b/l. Vibratory sensation intact.       Assessment:       Encounter Diagnosis   Name Primary?    Ingrown nail " Yes         Plan:       Viri was seen today for ingrown toenail.    Diagnoses and all orders for this visit:    Ingrown nail  -     Nail Removal      I counseled the patient on her conditions, their implications and medical management.    -status post left hallux medial border PNA with phenol.  Tolerated well.  See procedure note.  -recommend soaking instruction.  Weightbearing as tolerated in open toe shoe. OTC pain med.   -The nature of the condition, options for management, as well as potential risks and complications were discussed in detail with patient. Patient was amenable to my recommendations and left my office fully informed and will follow up as instructed or sooner if necessary.    -Patient was advised of signs and symptoms of infection including redness, drainage, purulence, odor, streaking, fever, chills and I advised patient to seek medical attention (ER or urgent care) if these symptoms arise.   -f/u 1 week

## 2019-09-05 NOTE — PROCEDURES
Nail Removal  Date/Time: 9/4/2019 5:50 PM  Performed by: Sidra Morgan DPM  Authorized by: Sidra Morgan DPM     Consent Done?:  Yes (Written)    Location:  Left foot  Location detail:  Left big toe  Anesthesia:  Local infiltration  Local anesthetic: lidocaine 1% without epinephrine  Anesthetic total (ml):  4  Preparation:  Skin prepped with alcohol and skin prepped with Betadine    Amount removed:  Partial  Wedge excision of skin of nail fold: No    Nail bed sutured?: No    Nail matrix removed:  Partial  Removed nail replaced and anchored: No    Dressing applied:  4x4, antibiotic ointment and gauze roll  Patient tolerance:  Patient tolerated the procedure well with no immediate complications     Time-out was performed with the patient the room

## 2019-09-11 ENCOUNTER — OFFICE VISIT (OUTPATIENT)
Dept: PODIATRY | Facility: CLINIC | Age: 84
End: 2019-09-11
Payer: MEDICARE

## 2019-09-11 VITALS
BODY MASS INDEX: 19.88 KG/M2 | WEIGHT: 108 LBS | DIASTOLIC BLOOD PRESSURE: 57 MMHG | SYSTOLIC BLOOD PRESSURE: 99 MMHG | HEIGHT: 62 IN | HEART RATE: 79 BPM

## 2019-09-11 DIAGNOSIS — Z09 FOLLOW-UP EXAMINATION FOLLOWING SURGERY: Primary | ICD-10-CM

## 2019-09-11 PROCEDURE — 99999 PR PBB SHADOW E&M-EST. PATIENT-LVL III: CPT | Mod: PBBFAC,HCNC,, | Performed by: PODIATRIST

## 2019-09-11 PROCEDURE — 99024 PR POST-OP FOLLOW-UP VISIT: ICD-10-PCS | Mod: HCNC,S$GLB,, | Performed by: PODIATRIST

## 2019-09-11 PROCEDURE — 99999 PR PBB SHADOW E&M-EST. PATIENT-LVL III: ICD-10-PCS | Mod: PBBFAC,HCNC,, | Performed by: PODIATRIST

## 2019-09-11 PROCEDURE — 99024 POSTOP FOLLOW-UP VISIT: CPT | Mod: HCNC,S$GLB,, | Performed by: PODIATRIST

## 2019-09-11 NOTE — PATIENT INSTRUCTIONS
General information: stay off year feet as much as possible today.  You may wear a surgical shoe, sandal or any open toe shoe that does not squeeze, cause trick or put pressure on her toes. Your toes may remain numb up for up to 24 hr after the procedure. Although most patients can wear a closed loose-fitting shoe after the 1st week, the toe will heal faster the more used open toe shoe in the first 2 weeks. Please contact our office if he have any questions or concerns.    Bleeding:  Slight bleeding, discoloration and drainage are normal.  Due to the chemical used there may be some yellow clear drainage coming from the toe for 2 weeks.     Discomfort:  You can elevate year foot to help elevate minus swelling, bleeding and discomfort.  You may also take Advil, Tylenol or any other over-the-counter pain medications to help alleviate the pain.  Call our office if the pain is well controlled.  Post patient's have very little discomfort as long as the minimized there walking for first 24 hr and without bump the toe.    Removing the bandage:  Remove the dressing 24 hr after procedure, carefully remove the dressing and shower as normal (No bath).  It is okay to get the bandage soak in the shower but please dry completely and applied triple antibiotic after taking a shower.     Dressing options  1. Soaking 2 times a day in warm water with a tea spoon of epsom salt and couple drops of Betadine for 15 min.  He will need to purchase this product from the pharmacy.  2. Dry toe did apply antibiotic cream or ointment such as Neosporin or Polysporin or triple antibiotic cover with Band-Aid.  3. In the 2nd week, take the dressing off at bedtime to air dry the toe  4. If you see signs of infection such as redness, swelling, yellow drainage, pain, please call the office and let us know.

## 2019-09-11 NOTE — PROGRESS NOTES
Subjective:      Patient ID: Viri Carpio is a 89 y.o. female.    Chief Complaint: Post-op Evaluation (P&A) and Follow-up    89 years old female presenting with posterior aspect of the right ankle.  She points posterior of the right heel .  Patient describes as throbbing pain.  Patient tells me pain started few days ago.  Patient denies any trauma or injury.  Patient is presenting with her daughter.  Patient was recently admitted and discharged from the hospital for congestive heart failure.  Patient states pain is only there when something makes a contact on the posterior aspect of the heel.  She tells me she does not have pain when she walk.  She is presenting with open toe shoe today.     9/4/19: pt is presenting with different problem. Pt is presenting with new onset of pain of left hallux. f/u for ingrown toenail left hallux medial border. With her daughter today. First noticed some drainage with redness couple days ago. Complains of pain. It is aching. Has been soaking. She tells me she is doing ok from heel standpoint.    9/11/19: s/p L hallux medial border PNA with phenol. With her daughter today. No pedal complaints. Pain is controlled. Doing soaking instruction with triple abx. wbat in open toe shoe today.        Hemoglobin A1C   Date Value Ref Range Status   08/30/2018 5.4 4.0 - 5.6 % Final     Comment:     ADA Screening Guidelines:  5.7-6.4%  Consistent with prediabetes  >or=6.5%  Consistent with diabetes  High levels of fetal hemoglobin interfere with the HbA1C  assay. Heterozygous hemoglobin variants (HbS, HgC, etc)do  not significantly interfere with this assay.   However, presence of multiple variants may affect accuracy.     05/31/2018 5.5 4.0 - 5.6 % Final     Comment:     ADA Screening Guidelines:  5.7-6.4%  Consistent with prediabetes  >or=6.5%  Consistent with diabetes  High levels of fetal hemoglobin interfere with the HbA1C  assay. Heterozygous hemoglobin variants (HbS, HgC, etc)do  not  significantly interfere with this assay.   However, presence of multiple variants may affect accuracy.     08/30/2017 5.5 4.0 - 5.6 % Final     Comment:     According to ADA guidelines, hemoglobin A1c <7.0% represents  optimal control in non-pregnant diabetic patients. Different  metrics may apply to specific patient populations.   Standards of Medical Care in Diabetes-2016.  For the purpose of screening for the presence of diabetes:  <5.7%     Consistent with the absence of diabetes  5.7-6.4%  Consistent with increasing risk for diabetes   (prediabetes)  >or=6.5%  Consistent with diabetes  Currently, no consensus exists for use of hemoglobin A1c  for diagnosis of diabetes for children.  This Hemoglobin A1c assay has significant interference with fetal   hemoglobin   (HbF). The results are invalid for patients with abnormal amounts of   HbF,   including those with known Hereditary Persistence   of Fetal Hemoglobin. Heterozygous hemoglobin variants (HbAS, HbAC,   HbAD, HbAE, HbA2) do not significantly interfere with this assay;   however, presence of multiple variants in a sample may impact the %   interference.         Review of Systems   Constitution: Negative for decreased appetite, fever and malaise/fatigue.   HENT: Negative for congestion.    Cardiovascular: Negative for chest pain and leg swelling.   Respiratory: Negative for cough and shortness of breath.    Skin: Positive for color change. Negative for nail changes and rash.   Musculoskeletal: Positive for arthritis and joint swelling. Negative for joint pain and muscle weakness.   Gastrointestinal: Negative for bloating, abdominal pain, nausea and vomiting.   Neurological: Negative for headaches, numbness and weakness.   Psychiatric/Behavioral: Negative for altered mental status.             Past Medical History:   Diagnosis Date    Atrial fibrillation     Depression     Diabetes mellitus, type 2     Hyperlipidemia     Insomnia     Peripheral artery  disease        Past Surgical History:   Procedure Laterality Date    APPENDECTOMY         No family history on file.    Social History     Socioeconomic History    Marital status:      Spouse name: Not on file    Number of children: Not on file    Years of education: Not on file    Highest education level: Not on file   Occupational History    Not on file   Social Needs    Financial resource strain: Not on file    Food insecurity:     Worry: Not on file     Inability: Not on file    Transportation needs:     Medical: Not on file     Non-medical: Not on file   Tobacco Use    Smoking status: Former Smoker    Smokeless tobacco: Former User   Substance and Sexual Activity    Alcohol use: No     Alcohol/week: 0.0 oz    Drug use: No    Sexual activity: Not on file   Lifestyle    Physical activity:     Days per week: Not on file     Minutes per session: Not on file    Stress: Not on file   Relationships    Social connections:     Talks on phone: Not on file     Gets together: Not on file     Attends Alevism service: Not on file     Active member of club or organization: Not on file     Attends meetings of clubs or organizations: Not on file     Relationship status: Not on file   Other Topics Concern    Not on file   Social History Narrative    Not on file       Current Outpatient Medications   Medication Sig Dispense Refill    acetaminophen (TYLENOL) 500 mg Cap       apixaban (ELIQUIS) 2.5 mg Tab Take 1 tablet (2.5 mg total) by mouth 2 (two) times daily. 180 tablet 3    aspirin (ECOTRIN) 81 MG EC tablet Take 81 mg by mouth once daily.      COMPR.STOCKING,KNEE,LONG,LARGE MISC       escitalopram oxalate (LEXAPRO) 10 MG tablet TAKE 1 TABLET EVERY DAY 90 tablet 1    furosemide (LASIX) 20 MG tablet Take 1 tablet (20 mg total) by mouth once daily. 90 tablet 3    hydrocortisone 2.5 % lotion       ketoconazole (NIZORAL) 2 % shampoo       levothyroxine (SYNTHROID) 25 MCG tablet Take 1 tablet (25  "mcg total) by mouth once daily. 90 tablet 3    lovastatin (MEVACOR) 20 MG tablet Take 1 tablet (20 mg total) by mouth every evening. 90 tablet 3    metoprolol succinate (TOPROL-XL) 100 MG 24 hr tablet Take 1 tablet (100 mg total) by mouth once daily. 30 tablet 11    nystatin-triamcinolone (MYCOLOG II) cream       olmesartan (BENICAR) 40 MG tablet Take 1 tablet (40 mg total) by mouth once daily. 90 tablet 3    STOOL SOFTENER 100 mg capsule       temazepam (RESTORIL) 15 mg Cap TAKE ONE CAPSULE BY MOUTH AT BEDTIME IF NEEDED 30 capsule 2    VOLTAREN 1 % Gel Apply 2 gram(s) to affected area 3 times a day as needed PRN MANAGEMENT  1     No current facility-administered medications for this visit.        Review of patient's allergies indicates:  No Known Allergies    Vitals:    09/11/19 1358   BP: (!) 99/57   Pulse: 79   Weight: 49 kg (108 lb)   Height: 5' 2" (1.575 m)   PainSc: 0-No pain       Objective:      Physical Exam   Constitutional: She is oriented to person, place, and time. She appears well-developed and well-nourished. No distress.   Musculoskeletal: She exhibits tenderness. She exhibits no edema or deformity.   Neurological: She is alert and oriented to person, place, and time.   Skin: Skin is warm. Capillary refill takes 2 to 3 seconds. No erythema.   Psychiatric: She has a normal mood and affect. Her behavior is normal.       Vascular: Distal DP/PT pulses palpable 1/4. CRT < 3 sec to tips of toes. + vericosities noted to LEs. Warm to touch LE ankle to touch to toes    Dermatologic:   L hallux: mild rubor along the medial border. No pus. No drainage.     Musculoskeletal: MMT 5/5 in DF/PF/Inv/Ev resistance with no reproduction of pain in any direction. Passive range of motion of ankle and pedal joints is painless. No calf tenderness LE, Compartments soft/compressible.   L hallux: mild tenderness medial border.     Neurological: Light touch, proprioception, and sharp/dull sensation are all intact. " Protective threshold with the [place]-Wienstein monofilament is intact b/l. Vibratory sensation intact.       Assessment:       Encounter Diagnosis   Name Primary?    Follow-up examination following surgery Yes         Plan:       Viri was seen today for post-op evaluation and follow-up.    Diagnoses and all orders for this visit:    Follow-up examination following surgery      I counseled the patient on her conditions, their implications and medical management.    -status post left hallux medial border PNA with phenol. Slow healing. No signs of necrosis or any infection. I once again mentioned to her and her daughter that seeing slow healing is expected due to her age.   -c/w soaking instruction.  Weightbearing as tolerated in open toe shoe. OTC pain med.   -The nature of the condition, options for management, as well as potential risks and complications were discussed in detail with patient. Patient was amenable to my recommendations and left my office fully informed and will follow up as instructed or sooner if necessary.    -Patient was advised of signs and symptoms of infection including redness, drainage, purulence, odor, streaking, fever, chills and I advised patient to seek medical attention (ER or urgent care) if these symptoms arise.   -f/u 2 week

## 2019-09-12 DIAGNOSIS — G47.00 INSOMNIA, UNSPECIFIED TYPE: ICD-10-CM

## 2019-09-12 DIAGNOSIS — F32.A DEPRESSION, UNSPECIFIED DEPRESSION TYPE: ICD-10-CM

## 2019-09-12 DIAGNOSIS — E03.4 HYPOTHYROIDISM DUE TO ACQUIRED ATROPHY OF THYROID: ICD-10-CM

## 2019-09-12 RX ORDER — ESCITALOPRAM OXALATE 10 MG/1
TABLET ORAL
Qty: 90 TABLET | Refills: 0 | Status: SHIPPED | OUTPATIENT
Start: 2019-09-12 | End: 2019-11-18 | Stop reason: SDUPTHER

## 2019-09-12 RX ORDER — LEVOTHYROXINE SODIUM 25 UG/1
25 TABLET ORAL DAILY
Qty: 90 TABLET | Refills: 0 | Status: SHIPPED | OUTPATIENT
Start: 2019-09-12 | End: 2019-11-18 | Stop reason: SDUPTHER

## 2019-09-23 RX ORDER — METOPROLOL SUCCINATE 100 MG/1
TABLET, EXTENDED RELEASE ORAL
Qty: 30 TABLET | Refills: 10 | Status: SHIPPED | OUTPATIENT
Start: 2019-09-23

## 2019-09-24 DIAGNOSIS — E78.5 HYPERLIPIDEMIA, UNSPECIFIED HYPERLIPIDEMIA TYPE: ICD-10-CM

## 2019-09-25 RX ORDER — LOVASTATIN 20 MG/1
20 TABLET ORAL NIGHTLY
Qty: 90 TABLET | Refills: 3 | Status: SHIPPED | OUTPATIENT
Start: 2019-09-25

## 2019-09-26 ENCOUNTER — OFFICE VISIT (OUTPATIENT)
Dept: PODIATRY | Facility: CLINIC | Age: 84
End: 2019-09-26
Payer: MEDICARE

## 2019-09-26 VITALS — DIASTOLIC BLOOD PRESSURE: 49 MMHG | SYSTOLIC BLOOD PRESSURE: 81 MMHG | HEART RATE: 109 BPM

## 2019-09-26 DIAGNOSIS — L60.0 INGROWN NAIL: Primary | ICD-10-CM

## 2019-09-26 PROCEDURE — 99999 PR PBB SHADOW E&M-EST. PATIENT-LVL III: CPT | Mod: PBBFAC,HCNC,, | Performed by: PODIATRIST

## 2019-09-26 PROCEDURE — 1101F PT FALLS ASSESS-DOCD LE1/YR: CPT | Mod: HCNC,CPTII,S$GLB, | Performed by: PODIATRIST

## 2019-09-26 PROCEDURE — 1101F PR PT FALLS ASSESS DOC 0-1 FALLS W/OUT INJ PAST YR: ICD-10-PCS | Mod: HCNC,CPTII,S$GLB, | Performed by: PODIATRIST

## 2019-09-26 PROCEDURE — 99213 PR OFFICE/OUTPT VISIT, EST, LEVL III, 20-29 MIN: ICD-10-PCS | Mod: HCNC,S$GLB,, | Performed by: PODIATRIST

## 2019-09-26 PROCEDURE — 99999 PR PBB SHADOW E&M-EST. PATIENT-LVL III: ICD-10-PCS | Mod: PBBFAC,HCNC,, | Performed by: PODIATRIST

## 2019-09-26 PROCEDURE — 99213 OFFICE O/P EST LOW 20 MIN: CPT | Mod: HCNC,S$GLB,, | Performed by: PODIATRIST

## 2019-09-26 NOTE — PROGRESS NOTES
Subjective:      Patient ID: Viri Carpio is a 89 y.o. female.    Chief Complaint: Follow-up (left foot) and Foot Pain (bottom of B/L feet hurt when walks)    89 years old female presenting with posterior aspect of the right ankle.  She points posterior of the right heel .  Patient describes as throbbing pain.  Patient tells me pain started few days ago.  Patient denies any trauma or injury.  Patient is presenting with her daughter.  Patient was recently admitted and discharged from the hospital for congestive heart failure.  Patient states pain is only there when something makes a contact on the posterior aspect of the heel.  She tells me she does not have pain when she walk.  She is presenting with open toe shoe today.     9/4/19: pt is presenting with different problem. Pt is presenting with new onset of pain of left hallux. f/u for ingrown toenail left hallux medial border. With her daughter today. First noticed some drainage with redness couple days ago. Complains of pain. It is aching. Has been soaking. She tells me she is doing ok from heel standpoint.    9/11/19: s/p L hallux medial border PNA with phenol. With her daughter today. No pedal complaints. Pain is controlled. Doing soaking instruction with triple abx. wbat in open toe shoe today.      9/26/19:  Status post left hallux medial border PNA with phenol.  Present with her daughter today.  No pedal complaints today.  Tells me she has been trying to keep her left foot clean.  Ambulating in open toe shoe.  Denies pain.      Hemoglobin A1C   Date Value Ref Range Status   08/30/2018 5.4 4.0 - 5.6 % Final     Comment:     ADA Screening Guidelines:  5.7-6.4%  Consistent with prediabetes  >or=6.5%  Consistent with diabetes  High levels of fetal hemoglobin interfere with the HbA1C  assay. Heterozygous hemoglobin variants (HbS, HgC, etc)do  not significantly interfere with this assay.   However, presence of multiple variants may affect accuracy.      05/31/2018 5.5 4.0 - 5.6 % Final     Comment:     ADA Screening Guidelines:  5.7-6.4%  Consistent with prediabetes  >or=6.5%  Consistent with diabetes  High levels of fetal hemoglobin interfere with the HbA1C  assay. Heterozygous hemoglobin variants (HbS, HgC, etc)do  not significantly interfere with this assay.   However, presence of multiple variants may affect accuracy.     08/30/2017 5.5 4.0 - 5.6 % Final     Comment:     According to ADA guidelines, hemoglobin A1c <7.0% represents  optimal control in non-pregnant diabetic patients. Different  metrics may apply to specific patient populations.   Standards of Medical Care in Diabetes-2016.  For the purpose of screening for the presence of diabetes:  <5.7%     Consistent with the absence of diabetes  5.7-6.4%  Consistent with increasing risk for diabetes   (prediabetes)  >or=6.5%  Consistent with diabetes  Currently, no consensus exists for use of hemoglobin A1c  for diagnosis of diabetes for children.  This Hemoglobin A1c assay has significant interference with fetal   hemoglobin   (HbF). The results are invalid for patients with abnormal amounts of   HbF,   including those with known Hereditary Persistence   of Fetal Hemoglobin. Heterozygous hemoglobin variants (HbAS, HbAC,   HbAD, HbAE, HbA2) do not significantly interfere with this assay;   however, presence of multiple variants in a sample may impact the %   interference.         Review of Systems   Constitution: Negative for decreased appetite, fever and malaise/fatigue.   HENT: Negative for congestion.    Cardiovascular: Negative for chest pain and leg swelling.   Respiratory: Negative for cough and shortness of breath.    Skin: Positive for color change. Negative for nail changes and rash.   Musculoskeletal: Positive for arthritis and joint swelling. Negative for joint pain and muscle weakness.   Gastrointestinal: Negative for bloating, abdominal pain, nausea and vomiting.   Neurological: Negative for  headaches, numbness and weakness.   Psychiatric/Behavioral: Negative for altered mental status.             Past Medical History:   Diagnosis Date    Atrial fibrillation     Depression     Diabetes mellitus, type 2     Hyperlipidemia     Insomnia     Peripheral artery disease        Past Surgical History:   Procedure Laterality Date    APPENDECTOMY         History reviewed. No pertinent family history.    Social History     Socioeconomic History    Marital status:      Spouse name: Not on file    Number of children: Not on file    Years of education: Not on file    Highest education level: Not on file   Occupational History    Not on file   Social Needs    Financial resource strain: Not on file    Food insecurity:     Worry: Not on file     Inability: Not on file    Transportation needs:     Medical: Not on file     Non-medical: Not on file   Tobacco Use    Smoking status: Former Smoker    Smokeless tobacco: Former User   Substance and Sexual Activity    Alcohol use: No     Alcohol/week: 0.0 standard drinks    Drug use: No    Sexual activity: Not on file   Lifestyle    Physical activity:     Days per week: Not on file     Minutes per session: Not on file    Stress: Not on file   Relationships    Social connections:     Talks on phone: Not on file     Gets together: Not on file     Attends Orthodox service: Not on file     Active member of club or organization: Not on file     Attends meetings of clubs or organizations: Not on file     Relationship status: Not on file   Other Topics Concern    Not on file   Social History Narrative    Not on file       Current Outpatient Medications   Medication Sig Dispense Refill    acetaminophen (TYLENOL) 500 mg Cap       apixaban (ELIQUIS) 2.5 mg Tab Take 1 tablet (2.5 mg total) by mouth 2 (two) times daily. 180 tablet 3    aspirin (ECOTRIN) 81 MG EC tablet Take 81 mg by mouth once daily.      COMPR.STOCKING,KNEE,LONG,LARGE MISC        escitalopram oxalate (LEXAPRO) 10 MG tablet TAKE 1 TABLET (10 MG TOTAL) BY MOUTH ONCE DAILY. 90 tablet 0    furosemide (LASIX) 20 MG tablet Take 1 tablet (20 mg total) by mouth once daily. 90 tablet 3    hydrocortisone 2.5 % lotion       ketoconazole (NIZORAL) 2 % shampoo       levothyroxine (SYNTHROID) 25 MCG tablet TAKE 1 TABLET (25 MCG TOTAL) BY MOUTH ONCE DAILY 90 tablet 0    lovastatin (MEVACOR) 20 MG tablet TAKE 1 TABLET (20 MG TOTAL) BY MOUTH EVERY EVENING. 90 tablet 3    metoprolol succinate (TOPROL-XL) 100 MG 24 hr tablet TAKE ONE TABLET BY MOUTH EVERY DAY 30 tablet 10    nystatin-triamcinolone (MYCOLOG II) cream       olmesartan (BENICAR) 40 MG tablet Take 1 tablet (40 mg total) by mouth once daily. 90 tablet 3    STOOL SOFTENER 100 mg capsule       temazepam (RESTORIL) 15 mg Cap TAKE ONE CAPSULE BY MOUTH AT BEDTIME IF NEEDED 30 capsule 2    VOLTAREN 1 % Gel Apply 2 gram(s) to affected area 3 times a day as needed PRN MANAGEMENT  1     No current facility-administered medications for this visit.        Review of patient's allergies indicates:  No Known Allergies    Vitals:    09/26/19 1435   BP: (!) 81/49   Pulse: 109   PainSc: 0-No pain       Objective:      Physical Exam   Constitutional: She is oriented to person, place, and time. She appears well-developed and well-nourished. No distress.   Musculoskeletal: She exhibits no edema, tenderness or deformity.   Neurological: She is alert and oriented to person, place, and time.   Skin: Skin is warm. Capillary refill takes 2 to 3 seconds. No erythema.   Psychiatric: She has a normal mood and affect. Her behavior is normal.       Vascular: Distal DP/PT pulses palpable 1/4. CRT < 3 sec to tips of toes. + vericosities noted to LEs. Warm to touch LE ankle to touch to toes    Dermatologic:   L hallux:  Status post PNA with phenol of medial border.  Healed uneventfully.     Musculoskeletal: MMT 5/5 in DF/PF/Inv/Ev resistance with no reproduction of pain  in any direction. Passive range of motion of ankle and pedal joints is painless. No calf tenderness LE, Compartments soft/compressible.   L hallux:  No tenderness medial border.     Neurological: Light touch, proprioception, and sharp/dull sensation are all intact. Protective threshold with the [place]-Wienstein monofilament is intact b/l. Vibratory sensation intact.       Assessment:       Encounter Diagnosis   Name Primary?    Ingrown nail - Left Foot Yes         Plan:       Viri was seen today for follow-up and foot pain.    Diagnoses and all orders for this visit:    Ingrown nail - Left Foot      I counseled the patient on her conditions, their implications and medical management.    -status post left hallux medial border PNA with phenol.  Healed completely.   -stable to transition to normal weight-bearing in normal shoe.   -The nature of the condition, options for management, as well as potential risks and complications were discussed in detail with patient. Patient was amenable to my recommendations and left my office fully informed and will follow up as instructed or sooner if necessary.    -Patient was advised of signs and symptoms of infection including redness, drainage, purulence, odor, streaking, fever, chills and I advised patient to seek medical attention (ER or urgent care) if these symptoms arise.   -f/u as needed

## 2019-09-27 ENCOUNTER — PES CALL (OUTPATIENT)
Dept: ADMINISTRATIVE | Facility: CLINIC | Age: 84
End: 2019-09-27

## 2019-10-02 ENCOUNTER — TELEPHONE (OUTPATIENT)
Dept: CARDIOLOGY | Facility: CLINIC | Age: 84
End: 2019-10-02

## 2019-10-02 ENCOUNTER — TELEPHONE (OUTPATIENT)
Dept: FAMILY MEDICINE | Facility: CLINIC | Age: 84
End: 2019-10-02

## 2019-10-02 NOTE — TELEPHONE ENCOUNTER
Called in medication after verifying with Guillaume Chaidez the prescription from 8/20/19 was never received. Daughter notified

## 2019-10-02 NOTE — TELEPHONE ENCOUNTER
----- Message from Jeni Peña sent at 10/2/2019  2:02 PM CDT -----  Contact: Daughter Favio [phone]  Patient's daughter is requesting to speak with you regarding medication. Please advise.

## 2019-10-02 NOTE — TELEPHONE ENCOUNTER
----- Message from Lauren Isidro sent at 10/2/2019  1:03 PM CDT -----  Patient's daughter, Favio, called.  No. [phone]  Restoril 15mg is not at [facility] in [place] yet.   Please authorize.   Favio will be out of town for 3 weeks.   Please call in a 2 month supply.

## 2019-10-04 RX ORDER — OLMESARTAN MEDOXOMIL 40 MG/1
TABLET ORAL
Qty: 90 TABLET | Refills: 1 | Status: SHIPPED | OUTPATIENT
Start: 2019-10-04 | End: 2019-10-15 | Stop reason: DRUGHIGH

## 2019-10-14 ENCOUNTER — PATIENT OUTREACH (OUTPATIENT)
Dept: ADMINISTRATIVE | Facility: OTHER | Age: 84
End: 2019-10-14

## 2019-10-14 DIAGNOSIS — E11.9 DIABETES MELLITUS WITHOUT COMPLICATION: Primary | ICD-10-CM

## 2019-10-15 ENCOUNTER — OFFICE VISIT (OUTPATIENT)
Dept: CARDIOLOGY | Facility: CLINIC | Age: 84
End: 2019-10-15
Payer: MEDICARE

## 2019-10-15 VITALS
DIASTOLIC BLOOD PRESSURE: 50 MMHG | SYSTOLIC BLOOD PRESSURE: 85 MMHG | WEIGHT: 103 LBS | BODY MASS INDEX: 18.95 KG/M2 | HEIGHT: 62 IN

## 2019-10-15 DIAGNOSIS — I48.19 PERSISTENT ATRIAL FIBRILLATION: Primary | ICD-10-CM

## 2019-10-15 DIAGNOSIS — I50.22 CHRONIC SYSTOLIC CONGESTIVE HEART FAILURE: ICD-10-CM

## 2019-10-15 DIAGNOSIS — R09.89 DEPRESSED LEFT VENTRICULAR EJECTION FRACTION: ICD-10-CM

## 2019-10-15 DIAGNOSIS — I10 BENIGN ESSENTIAL HTN: ICD-10-CM

## 2019-10-15 DIAGNOSIS — E78.2 MIXED HYPERLIPIDEMIA: ICD-10-CM

## 2019-10-15 PROCEDURE — 1101F PT FALLS ASSESS-DOCD LE1/YR: CPT | Mod: HCNC,CPTII,S$GLB, | Performed by: INTERNAL MEDICINE

## 2019-10-15 PROCEDURE — 99215 OFFICE O/P EST HI 40 MIN: CPT | Mod: HCNC,S$GLB,, | Performed by: INTERNAL MEDICINE

## 2019-10-15 PROCEDURE — 99999 PR PBB SHADOW E&M-EST. PATIENT-LVL III: CPT | Mod: PBBFAC,HCNC,, | Performed by: INTERNAL MEDICINE

## 2019-10-15 PROCEDURE — 99215 PR OFFICE/OUTPT VISIT, EST, LEVL V, 40-54 MIN: ICD-10-PCS | Mod: HCNC,S$GLB,, | Performed by: INTERNAL MEDICINE

## 2019-10-15 PROCEDURE — 99999 PR PBB SHADOW E&M-EST. PATIENT-LVL III: ICD-10-PCS | Mod: PBBFAC,HCNC,, | Performed by: INTERNAL MEDICINE

## 2019-10-15 PROCEDURE — 1101F PR PT FALLS ASSESS DOC 0-1 FALLS W/OUT INJ PAST YR: ICD-10-PCS | Mod: HCNC,CPTII,S$GLB, | Performed by: INTERNAL MEDICINE

## 2019-10-15 RX ORDER — TEMAZEPAM 15 MG/1
CAPSULE ORAL
Qty: 30 CAPSULE | Refills: 3 | OUTPATIENT
Start: 2019-10-15

## 2019-10-15 RX ORDER — OLMESARTAN MEDOXOMIL 20 MG/1
20 TABLET ORAL DAILY
Qty: 90 TABLET | Refills: 3 | Status: SHIPPED | OUTPATIENT
Start: 2019-10-15 | End: 2020-10-14

## 2019-10-15 NOTE — PROGRESS NOTES
[facility]    Chief Complaint   Patient presents with    Follow-up       Patient ID: Viri Carpio is a 89 y.o. female with a past medical history of HLD, who presents for a follow-up appointment.  Pertinent history/events are as follows:     -Pt kindly referred by Dr. Casiano for evaluation of new onset Afib, SOB, LE edema.    -At our initial clinic visit on 10/17/2018, Mrs. Carpio is accompanied by her daughter.  She reported onset of ATKINSON beginning approximately 2 weeks ago.  Around the same time, she noticed swelling in both legs/ankles.  She has no chest pain.  EKG done today by PCP shows new onset atrial fibrillation with rate of 83 bpm.    Plan: Pt well rate controlled and asymptomatic on atenolol 100 mg daily.  Will pursue rate control strategy at this time.  Pt is >81 hyears old and weighs 55 kg.  Decrease Eliquis to 2.5 mg bid.  Place 30 day event monitor.  Check echo.  CXR, cmp, proBNP level today.  Continue ASA and statin for PAD, which is stable.     -Studies from 10/17/2018 revealed: proBNP elevated at 2210.  CXR with bilateral perihilar and basilar opacities with interstitial prominence concerning for edema.  There is blunting of the costophrenic angles bilaterally suspicious for pleural fluid.    -At follow up clinic visit on 10/26/2018,  reported feeling much better since our initial visit on 10/17/2018.  Weight has decreased from 123 pounds to 110.  LE edema and SOB resolved.  Repeat CXR from 10/24/2018 shows significant improvement of pulmonary edema.  Cr stable.  Now taking lasix 20 mg daily instead of bid. 30 day event monitor in place.  Echo from 10/22/2018 shows mildly to moderately depressed left ventricular systolic function (EF 40-45%). (poor visualization of endocardial borders but EF appear mildly reduce in some views).  impaired LV relaxation, increased LVEDP; normal right ventricular systolic function; estimated PA systolic pressure is 38 mmHg.  Plan:  Symptoms now resolved.  Pt well compensated on exam.  Etiology unclear.  Possibilities include Afib induced cardiomyopathy, and ischemia.  Check nuclear stress test to evaluate for ischemia.  Given reduced EF, will discontinue atenolol 100 mg daily and start metoprolol succinate 100 mg daily. Pt is >81 years old and weighs 55 kg.  Continue Eliquis to 2.5 mg bid.  30 day event monitor in place.          -At clinic visit on 12/11/2018, Mrs. Carpio is accompanied by her daughter.  Reported no chest pain, SOB, or significant LE edema.  Awaiting 30 day event monitor results.  Pt and her daughter decided not have stress test done.   Blood pressure today  Is 124/70 with pulse of 77 bpm.  Plan: Continue metoprolol succinate 100 mg daily, and olmesartan 40 mg daily.  Continue lasix 20 mg daily.  Continue daily weights.    Regarding new onset Afib, awaiting 30 day event monitor results.  Pt appears to be in Afib today by auscultation.  Will continue to pursue rate control strategy at this time. Given reduced EF,continue metoprolol succinate 100 mg daily.  Continue Eliquis to 2.5 mg bid.    -On 2/17/2019, pt admitted with weakness (details per hospital course below):    Patient found to be saturating 88% on room air, no history of O2 at home.  Chest x-ray consistent with vascular congestion and pulmonary edema of CHF.  Patient had echocardiogram done in August 2018 which showed a left ventricular ejection fraction 40-45% and diastolic dysfunction.  Patient started on IV Lasix with good urinary output.  Admitted with acute CHF exacerbation for further evaluation treatment.     Hospital Course:   Patient admitted and started on IV Lasix with good urinary output.  Patient's breathing improved decreasing requirements of supplemental O2 down to 2 L but still desaturating when off of O2.  Lasix continued and patient continued to diurese well.  On 2nd day of admission, patient off supplemental O2 at rest.  6 min walk test completed  with respiratory therapy which showed only desaturation to 92%.  Patient clinically improved and medically stable for discharge home with instructions to follow-up with PCP and Cardiology in 1-2 weeks.  Will increase Lasix dose for 3 more days and then return to normal Lasix dose.  Counseled patient on low-salt diet.    -At clinic visit on 2/26/2019, Mrs. Carpio reported feeling much better since hospital discharge.  She has no chest pain, SOB, or LE edema.  Now taking lasix 20 mg daily.  Thirty day event monitor from 11/24/2018 shows rate controlled Afib.    Plan:   Acute Heart Failure Exacerbation with Reduced LVEF (40-45%)- Pt with recent hospital admission.  Symptoms now resolved.  Pt well compensated on exam.  Continue metoprolol succinate 100 mg daily, and olmesartan 40 mg daily.  Continue lasix 20 mg daily.  Continue daily weights.    New Onset Atrial Fibrillation-  Thirty day event monitor from 11/24/2018 shows rate controlled Afib.  Will continue to pursue rate control strategy at this time. Given reduced EF,continue metoprolol succinate 100 mg daily.  Continue Eliquis to 2.5 mg bid.        PAD- Stable.  Continue ASA and statin.    -At clinic visit on 6/25/2019, Mrs. Carpio is accompanied by her daughter.  She reports no chest pain, SOB, or LE edema.  Blood pressure today 128/68 with pulse of 72.   Plan:   Acute Heart Failure Exacerbation with Reduced LVEF (40-45%)- Pt with recent hospital admission.  Symptoms now resolved.  Pt well compensated on exam.  Continue metoprolol succinate 100 mg daily, and olmesartan 40 mg daily.  Continue lasix 20 mg daily.  Continue daily weights.    New Onset Atrial Fibrillation-  Thirty day event monitor from 11/24/2018 shows rate controlled Afib.  Will continue to pursue rate control strategy at this time. Given reduced EF,continue metoprolol succinate 100 mg daily.  Continue Eliquis to 2.5 mg bid.        PAD- Stable.  Continue ASA and statin.    HPI:  Mrs. Carpio is  accompanied by her daughter.  She reports no chest pain, SOB, LE edema, TIA symptoms or syncope.  Blood pressure low today at 94/58.  Pt with no dizziness or lightheadedness.     Past Medical History:   Diagnosis Date    Atrial fibrillation     Depression     Diabetes mellitus, type 2     Hyperlipidemia     Insomnia     Peripheral artery disease      Past Surgical History:   Procedure Laterality Date    APPENDECTOMY       Social History     Socioeconomic History    Marital status:      Spouse name: Not on file    Number of children: Not on file    Years of education: Not on file    Highest education level: Not on file   Occupational History    Not on file   Social Needs    Financial resource strain: Not on file    Food insecurity:     Worry: Not on file     Inability: Not on file    Transportation needs:     Medical: Not on file     Non-medical: Not on file   Tobacco Use    Smoking status: Former Smoker    Smokeless tobacco: Former User   Substance and Sexual Activity    Alcohol use: No     Alcohol/week: 0.0 standard drinks    Drug use: No    Sexual activity: Not on file   Lifestyle    Physical activity:     Days per week: Not on file     Minutes per session: Not on file    Stress: Not on file   Relationships    Social connections:     Talks on phone: Not on file     Gets together: Not on file     Attends Evangelical service: Not on file     Active member of club or organization: Not on file     Attends meetings of clubs or organizations: Not on file     Relationship status: Not on file   Other Topics Concern    Not on file   Social History Narrative    Not on file     No family history on file.    Review of patient's allergies indicates:  No Known Allergies    Medication List with Changes/Refills   Current Medications    ACETAMINOPHEN (TYLENOL) 500 MG CAP        APIXABAN (ELIQUIS) 2.5 MG TAB    Take 1 tablet (2.5 mg total) by mouth 2 (two) times daily.    ASPIRIN (ECOTRIN) 81 MG EC  "TABLET    Take 81 mg by mouth once daily.    COLD-HOT PACK (ACE REUSABLE COLD COMPRESS MISC)        COMPR.STOCKING,KNEE,LONG,LARGE MISC        ESCITALOPRAM OXALATE (LEXAPRO) 10 MG TABLET    TAKE 1 TABLET (10 MG TOTAL) BY MOUTH ONCE DAILY.    FLUAD 4980-1622, 65 YR UP,,PF, 45 MCG (15 MCG X 3)/0.5 ML SYRG        FUROSEMIDE (LASIX) 20 MG TABLET    Take 1 tablet (20 mg total) by mouth once daily.    HYDROCORTISONE 2.5 % LOTION        KETOCONAZOLE (NIZORAL) 2 % SHAMPOO        LEVOTHYROXINE (SYNTHROID) 25 MCG TABLET    TAKE 1 TABLET (25 MCG TOTAL) BY MOUTH ONCE DAILY    LOVASTATIN (MEVACOR) 20 MG TABLET    TAKE 1 TABLET (20 MG TOTAL) BY MOUTH EVERY EVENING.    METOPROLOL SUCCINATE (TOPROL-XL) 100 MG 24 HR TABLET    TAKE ONE TABLET BY MOUTH EVERY DAY    NYSTATIN-TRIAMCINOLONE (MYCOLOG II) CREAM        OLMESARTAN (BENICAR) 40 MG TABLET    TAKE 1 TABLET BY MOUTH EVERY DAY    STOOL SOFTENER 100 MG CAPSULE        TEMAZEPAM (RESTORIL) 15 MG CAP    TAKE ONE CAPSULE BY MOUTH AT BEDTIME IF NEEDED    VOLTAREN 1 % GEL    Apply 2 gram(s) to affected area 3 times a day as needed PRN MANAGEMENT       Review of Systems  Constitution: Denies chills, fever, and sweats.  HENT: Denies headaches or blurry vision.  Cardiovascular: Denies chest pain or irregular heart beat.  Respiratory: Denies cough or shortness of breath.  Gastrointestinal: Denies abdominal pain, nausea, or vomiting.  Musculoskeletal: Denies muscle cramps.  Neurological: Denies dizziness or focal weakness.  Psychiatric/Behavioral: Normal mental status.  Hematologic/Lymphatic: Denies bleeding problem or easy bruising/bleeding.  Skin: Denies rash or suspicious lesions    Physical Examination  BP (!) 85/50 (BP Location: Left arm, Patient Position: Sitting, BP Method: Medium (Automatic))   Ht 5' 2" (1.575 m)   Wt 46.7 kg (103 lb)   BMI 18.84 kg/m²     Constitutional: No acute distress, conversant  HEENT: Sclera anicteric, Pupils equal, round and reactive to light, extraocular " motions intact, Oropharynx clear  Neck: No JVD, no carotid bruits  Cardiovascular: Irregularly irregular, no rubs or gallops  Pulmonary: Clear to auscultation bilaterally  Abdominal: Abdomen soft, nontender, nondistended, positive bowel sounds  Extremities: No lower extremity edema,   Pulses:  Carotid pulses are 2+ on the right side, and 2+ on the left side.  Radial pulses are 2+ on the right side, and 2+ on the left side.   Femoral pulses are 2+ on the right side, and 2+ on the left side.  Skin: No ecchymosis, erythema, or ulcers  Psych: Alert and oriented x 3, appropriate affect  Neuro: CNII-XII intact, no focal deficits    Labs:  Most Recent Data  CBC:   Lab Results   Component Value Date    WBC 4.86 02/19/2019    HGB 11.6 (L) 02/19/2019    HCT 36.0 (L) 02/19/2019     02/19/2019    MCV 93 02/19/2019    RDW 15.3 (H) 02/19/2019     BMP:   Lab Results   Component Value Date     02/27/2019    K 3.9 02/27/2019    CL 99 02/27/2019    CO2 28 02/27/2019    BUN 17 02/27/2019    CREATININE 0.77 02/27/2019    GLU 95 02/27/2019    CALCIUM 9.1 02/27/2019    MG 1.5 (L) 02/27/2019     LFTS;   Lab Results   Component Value Date    PROT 6.3 02/19/2019    ALBUMIN 2.9 (L) 02/19/2019    BILITOT 0.8 02/19/2019    AST 15 02/19/2019    ALKPHOS 78 02/19/2019    ALT 7 (L) 02/19/2019     COAGS:   Lab Results   Component Value Date    INR 1.6 (H) 02/17/2019     FLP:   Lab Results   Component Value Date    CHOL 148 08/30/2018    HDL 46 08/30/2018    LDLCALC 82.0 08/30/2018    TRIG 100 08/30/2018    CHOLHDL 31.1 08/30/2018     CARDIAC:   Lab Results   Component Value Date    TROPONINI <0.012 02/18/2019       EKG 10/17/2018:  Atrial fibrillation with rate of 84 bpm     30  Day Event Monitor 11/24/2018:  An event monitor with auto-triggering capabilities was issued between 10/25/2018 and 11/24/2018.    Two patient triggered asymptomatic transmissions were made.  Both showed atrial fibrillation with relatively controlled rate.  On  baseline recording, heart rate was  and on the second recording, it was  beats per minute.  The shortest RR   interval noted was 480 milliseconds.  The longest RR interval noted was 1050 milliseconds.    Echo 10/22/2018:  CONCLUSIONS     1 - Mildly to moderately depressed left ventricular systolic function (EF 40-45%). Poor visualization of endocardial borders but EF appear mildly reduce in some views.     2 - Impaired LV relaxation, increased LVEDP.     3 - Normal right ventricular systolic function .     4 - The estimated PA systolic pressure is 38 mmHg.     Assessment/Plan:  Viri Carpio is a 89 y.o. female with a past medical history of HLD, who presents for a follow up appointment.  Currently doing well with no complaints.    1.  Low Blood Pressure- Blood pressure low today at 94/58.  Pt with no dizziness or lightheadedness.  Decrease olmesartan to 20 mg daily.  Pt to keep log of blood pressure/heart rate and bring in next visit for review.      2. Chronic Heart Failure with Reduced LVEF (40-45%)- Pt well compensated on exam.  Continue metoprolol succinate 100 mg daily, and olmesartan 40 mg daily.  Continue lasix 20 mg daily.  Continue daily weights.      3. Atrial Fibrillation-  Thirty day event monitor from 11/24/2018 shows rate controlled Afib.  Will continue to pursue rate control strategy at this time. Given reduced EF,continue metoprolol succinate 100 mg daily.  Continue Eliquis to 2.5 mg bid.          4. PAD- Stable.  Continue ASA and statin.      Follow up in 4 months      Total duration of face to face visit time 30 minutes.  Total time spent counseling greater than fifty percent of total visit time.  Counseling included discussion regarding imaging findings, diagnosis, possibilities, treatment options, risks and benefits.  The patient had many questions regarding the options and long-term effects.    Chucky Palacios MD, PhD  Interventional Cardiology

## 2019-10-15 NOTE — PATIENT INSTRUCTIONS
Assessment/Plan:  Viri Carpio is a 89 y.o. female with a past medical history of HLD, who presents for a follow up appointment.  Currently doing well with no complaints.    1.  Low Blood Pressure- Blood pressure low today at 94/58.  Pt with no dizziness or lightheadedness.  Decrease olmesartan to 20 mg daily.  Pt to keep log of blood pressure/heart rate and bring in next visit for review.      2. Chronic Heart Failure with Reduced LVEF (40-45%)- Pt well compensated on exam.  Continue metoprolol succinate 100 mg daily, and olmesartan 40 mg daily.  Continue lasix 20 mg daily.  Continue daily weights.      3. Atrial Fibrillation-  Thirty day event monitor from 11/24/2018 shows rate controlled Afib.  Will continue to pursue rate control strategy at this time. Given reduced EF,continue metoprolol succinate 100 mg daily.  Continue Eliquis to 2.5 mg bid.          4. PAD- Stable.  Continue ASA and statin.      Follow up in 4 months

## 2019-11-18 DIAGNOSIS — E03.4 HYPOTHYROIDISM DUE TO ACQUIRED ATROPHY OF THYROID: ICD-10-CM

## 2019-11-18 DIAGNOSIS — F32.A DEPRESSION, UNSPECIFIED DEPRESSION TYPE: ICD-10-CM

## 2019-11-18 RX ORDER — LEVOTHYROXINE SODIUM 25 UG/1
25 TABLET ORAL DAILY
Qty: 90 TABLET | Refills: 0 | Status: SHIPPED | OUTPATIENT
Start: 2019-11-18 | End: 2020-11-17

## 2019-11-18 RX ORDER — ESCITALOPRAM OXALATE 10 MG/1
TABLET ORAL
Qty: 90 TABLET | Refills: 0 | Status: SHIPPED | OUTPATIENT
Start: 2019-11-18

## 2019-11-27 DIAGNOSIS — G47.00 INSOMNIA, UNSPECIFIED TYPE: ICD-10-CM

## 2019-11-27 RX ORDER — TEMAZEPAM 15 MG/1
CAPSULE ORAL
Qty: 30 CAPSULE | Refills: 0 | Status: SHIPPED | OUTPATIENT
Start: 2019-11-27

## 2019-11-27 NOTE — TELEPHONE ENCOUNTER
----- Message from Debra Willett sent at 11/27/2019 10:15 AM CST -----  Contact: Favio Sheffield   Daughter  [phone]    Patient needs a refill of her sleep medicine. She is almost out of medication and her daughter called last week.    [facility]

## 2019-11-27 NOTE — TELEPHONE ENCOUNTER
----- Message from Kianna Rodriguez sent at 11/27/2019 10:49 AM CST -----  Contact: daughter Favio [phone]  Patient's daughter is returning your call. Please advise.

## 2019-11-28 ENCOUNTER — HOSPITAL ENCOUNTER (EMERGENCY)
Facility: HOSPITAL | Age: 84
End: 2019-11-28
Attending: EMERGENCY MEDICINE
Payer: MEDICARE

## 2019-11-28 DIAGNOSIS — I46.9 CARDIAC ARREST: Primary | ICD-10-CM

## 2019-11-28 PROCEDURE — 99900035 HC TECH TIME PER 15 MIN (STAT): Mod: HCNC

## 2019-11-28 PROCEDURE — 92950 HEART/LUNG RESUSCITATION CPR: CPT | Mod: HCNC

## 2019-11-28 PROCEDURE — 99285 EMERGENCY DEPT VISIT HI MDM: CPT | Mod: 25,HCNC

## 2019-11-28 NOTE — ED NOTES
Family left bedside. Patients hearring Aids given to family. Nurse explained to family that patient would remain in our morgue until  home was able to . Family understood. They stated they would call  home themselves on Friday if not contacted sooner due to holiday.

## 2019-11-28 NOTE — ED NOTES
Family at bedside. Daughter (Favio Sheffield) is next of kin. Phone number to contact is [phone].  arrangements already made with [facility] and  Home. Patient history of high cholesterol, high BP, and Afib. No past hx of heart attacks or strokes. Family states patient was pretty healthy.

## 2019-11-28 NOTE — ED PROVIDER NOTES
Encounter Date: 11/28/2019    SCRIBE #1 NOTE: I, Nina Iyer, am scribing for, and in the presence of,  Dr. Michel . I have scribed the entire note.       History     Chief Complaint   Patient presents with    Cardiac Arrest     Patient brought in by EMS as a resusitated Code. Family heard patient fall in restroom. No pulse felt. CPR started by grandson. EMS gave a total of 4 Epi. ROSC obtained. No pulse felt when patient was placed on ER stretcher.     Viri Carpio is a 89 y.o. female who has a past medical history of Atrial Fibrillation and high cholesterol.     The patient presents to the ED due to loss of consciousness that occurred PTA. EMS reports per family, the pt was downstairs using the bathroom alone and upon going downstairs she was found unconscious. Family is unsure of how long patient was down. Family started CPR approximately 5 minutes prior to arrival. Per EMS, pt given four epinephrine and started on dopamine drip en route.      The history is provided by the EMS personnel. The history is limited by the condition of the patient.     Review of patient's allergies indicates:  Allergies not on file  No past medical history on file.  No past surgical history on file.  No family history on file.  Social History     Tobacco Use    Smoking status: Not on file   Substance Use Topics    Alcohol use: Not on file    Drug use: Not on file     Review of Systems   Unable to perform ROS: Patient unresponsive       Physical Exam     Initial Vitals   BP Pulse Resp Temp SpO2   -- -- -- -- --      MAP       --         Physical Exam    Nursing note and vitals reviewed.  Constitutional:   Intubated, no spontaneous movement   HENT:   Head: Atraumatic.   Eyes:   Fixed, dilated   Cardiovascular:   No spontaneous heart sounds, CPR in progress   Pulmonary/Chest:   Intubated, confirmed ET tube, no spontaneous respirations   Abdominal: Soft.   Neurological:   No spontaneous neurological function   Skin:   Pale,  dry   Psychiatric:   Unable to assess         ED Course   Critical Care  Date/Time: 11/28/2019 5:00 AM  Performed by: Marilu Michel MD  Authorized by: Marilu Michel MD   Total critical care time (exclusive of procedural time) : 0 minutes  Critical care was necessary to treat or prevent imminent or life-threatening deterioration of the following conditions: cardiac failure, circulatory failure and respiratory failure.  Critical care was time spent personally by me on the following activities: development of treatment plan with patient or surrogate, evaluation of patient's response to treatment, examination of patient, re-evaluation of patient's condition, review of old charts, obtaining history from patient or surrogate, ordering and performing treatments and interventions and ventilator management.        Labs Reviewed - No data to display       Imaging Results    None          Medical Decision Making:   Initial Assessment:   89-year-old female, unknown medical history, presents the ER for evaluation of cardiac arrest.  Patient arrived via  [facility], they reported that this was a witnessed cardiac arrest, with CPR done by family.  EMS arrived approximately 15 min later, and obtain rosc via epinephrine.  Intubated in the field.  Reported patient had week thready pulses.  Was started on dopamine by EMS.  On arrival, patient had a weak thready pulse, ET tube was confirmed with direct visualization, end-tidal CO2 and auscultation she was taken to the resuscitation room, placed on monitor, patient appears to 3rd degree heart block, as we are getting ready to perform EKG, patient coded.  ACLS protocol was followed, please see nurse code sheet for further information.  Unfortunately despite multiple rounds of medication, no spontaneous pulse was obtained. Bedside ultrasound confirmed cardiac standstill with no spontaneous cardiac activity.  Time of death called at 1234.  Family arrived in the ER was  aware.              Attending Attestation:           Physician Attestation for Scribe:  Physician Attestation Statement for Scribe #1: I, Dr. Michel , reviewed documentation, as scribed by Nina Iyer in my presence, and it is both accurate and complete.                               Clinical Impression:     1. Cardiac arrest                               Marilu Michel MD  11/28/19 0618

## 2022-08-09 NOTE — TELEPHONE ENCOUNTER
I called and spoke with patient daughter, she will come by office to  patient sleep medication scrip. Vf/ma   Lot #: [id number] Lot #: [id number]

## 2025-04-30 NOTE — PATIENT INSTRUCTIONS
Step-by-Step  Washing Your Hands    Date Last Reviewed: 5/28/2015  © 7267-3207 [employer]. [address] [place] [address], [place] [address]. All rights reserved. This information is not intended as a substitute for professional medical care. Always follow your healthcare professional's instructions.         Recurrent UTI Recommendations:  Please always ensure your urine is sent for a URINE CULTURE if you get UTIs frequently.    Cranberry vitamins (500 mg daily) NOT JUICE- juice is too sugary and acidic    D-mannose: this helps build a protection in the bladder to prevent bacterial growth    Vaginal moisturizers (Replens/Lenuva/coconut oil): you can help protect against bacteria from the outside, not just the inside; lack of estrogen makes the pelvic area more susceptible to infection in post-menopausal women    Good hydration helps to dilute urine    Speak with a  urologist/urogynecologist if infections or symptoms of burning/frequency continue to consider preventive UTI management or incontinence management    Also wipe front to back    Shower instead of baths    Change feminine products every 2-4 hours    REMEMBER: there are dozens of bacteria that can cause a UTI, not every antibiotic treats every bacteria. This is why a CULTURE is important to tell us EXACTLY which bacteria is growing.